# Patient Record
Sex: FEMALE | Race: OTHER | NOT HISPANIC OR LATINO
[De-identification: names, ages, dates, MRNs, and addresses within clinical notes are randomized per-mention and may not be internally consistent; named-entity substitution may affect disease eponyms.]

---

## 2017-03-13 ENCOUNTER — APPOINTMENT (OUTPATIENT)
Dept: CARDIOLOGY | Facility: CLINIC | Age: 67
End: 2017-03-13

## 2017-03-13 ENCOUNTER — NON-APPOINTMENT (OUTPATIENT)
Age: 67
End: 2017-03-13

## 2017-03-13 VITALS
HEIGHT: 65 IN | DIASTOLIC BLOOD PRESSURE: 79 MMHG | OXYGEN SATURATION: 98 % | SYSTOLIC BLOOD PRESSURE: 117 MMHG | HEART RATE: 68 BPM | BODY MASS INDEX: 26.33 KG/M2 | WEIGHT: 158 LBS

## 2017-03-21 ENCOUNTER — APPOINTMENT (OUTPATIENT)
Dept: CARDIOLOGY | Facility: CLINIC | Age: 67
End: 2017-03-21

## 2017-05-31 ENCOUNTER — RX RENEWAL (OUTPATIENT)
Age: 67
End: 2017-05-31

## 2017-09-18 ENCOUNTER — NON-APPOINTMENT (OUTPATIENT)
Age: 67
End: 2017-09-18

## 2017-09-18 ENCOUNTER — APPOINTMENT (OUTPATIENT)
Dept: CARDIOLOGY | Facility: CLINIC | Age: 67
End: 2017-09-18
Payer: MEDICARE

## 2017-09-18 VITALS — DIASTOLIC BLOOD PRESSURE: 63 MMHG | SYSTOLIC BLOOD PRESSURE: 97 MMHG

## 2017-09-18 VITALS
HEIGHT: 65 IN | SYSTOLIC BLOOD PRESSURE: 99 MMHG | DIASTOLIC BLOOD PRESSURE: 64 MMHG | HEART RATE: 58 BPM | BODY MASS INDEX: 25.66 KG/M2 | OXYGEN SATURATION: 99 % | WEIGHT: 154 LBS

## 2017-09-18 VITALS — DIASTOLIC BLOOD PRESSURE: 78 MMHG | SYSTOLIC BLOOD PRESSURE: 108 MMHG

## 2017-09-18 PROCEDURE — 99214 OFFICE O/P EST MOD 30 MIN: CPT

## 2017-09-18 PROCEDURE — 93000 ELECTROCARDIOGRAM COMPLETE: CPT

## 2018-02-12 ENCOUNTER — APPOINTMENT (OUTPATIENT)
Dept: CARDIOLOGY | Facility: CLINIC | Age: 68
End: 2018-02-12
Payer: MEDICARE

## 2018-02-12 ENCOUNTER — NON-APPOINTMENT (OUTPATIENT)
Age: 68
End: 2018-02-12

## 2018-02-12 VITALS
HEART RATE: 66 BPM | DIASTOLIC BLOOD PRESSURE: 78 MMHG | BODY MASS INDEX: 25.46 KG/M2 | SYSTOLIC BLOOD PRESSURE: 120 MMHG | OXYGEN SATURATION: 98 % | WEIGHT: 153 LBS

## 2018-02-12 PROCEDURE — 99214 OFFICE O/P EST MOD 30 MIN: CPT

## 2018-02-12 PROCEDURE — 93000 ELECTROCARDIOGRAM COMPLETE: CPT

## 2018-02-13 ENCOUNTER — OUTPATIENT (OUTPATIENT)
Dept: OUTPATIENT SERVICES | Facility: HOSPITAL | Age: 68
LOS: 1 days | End: 2018-02-13
Payer: MEDICARE

## 2018-02-13 ENCOUNTER — APPOINTMENT (OUTPATIENT)
Dept: RADIOLOGY | Facility: CLINIC | Age: 68
End: 2018-02-13
Payer: MEDICARE

## 2018-02-13 ENCOUNTER — APPOINTMENT (OUTPATIENT)
Dept: MAMMOGRAPHY | Facility: CLINIC | Age: 68
End: 2018-02-13
Payer: MEDICARE

## 2018-02-13 ENCOUNTER — APPOINTMENT (OUTPATIENT)
Dept: DERMATOLOGY | Facility: CLINIC | Age: 68
End: 2018-02-13
Payer: MEDICARE

## 2018-02-13 VITALS — HEIGHT: 65 IN | WEIGHT: 165 LBS | BODY MASS INDEX: 27.49 KG/M2

## 2018-02-13 DIAGNOSIS — Z00.8 ENCOUNTER FOR OTHER GENERAL EXAMINATION: ICD-10-CM

## 2018-02-13 PROCEDURE — 77080 DXA BONE DENSITY AXIAL: CPT | Mod: 26

## 2018-02-13 PROCEDURE — 77067 SCR MAMMO BI INCL CAD: CPT

## 2018-02-13 PROCEDURE — 77067 SCR MAMMO BI INCL CAD: CPT | Mod: 26

## 2018-02-13 PROCEDURE — 77080 DXA BONE DENSITY AXIAL: CPT

## 2018-02-13 PROCEDURE — 77063 BREAST TOMOSYNTHESIS BI: CPT | Mod: 26

## 2018-02-13 PROCEDURE — 99213 OFFICE O/P EST LOW 20 MIN: CPT

## 2018-02-13 PROCEDURE — 77063 BREAST TOMOSYNTHESIS BI: CPT

## 2018-08-31 ENCOUNTER — APPOINTMENT (OUTPATIENT)
Dept: CARDIOLOGY | Facility: CLINIC | Age: 68
End: 2018-08-31
Payer: MEDICARE

## 2018-08-31 ENCOUNTER — NON-APPOINTMENT (OUTPATIENT)
Age: 68
End: 2018-08-31

## 2018-08-31 VITALS
OXYGEN SATURATION: 99 % | DIASTOLIC BLOOD PRESSURE: 70 MMHG | HEART RATE: 68 BPM | RESPIRATION RATE: 16 BRPM | SYSTOLIC BLOOD PRESSURE: 120 MMHG | HEIGHT: 65 IN

## 2018-08-31 VITALS
BODY MASS INDEX: 25.66 KG/M2 | HEIGHT: 65 IN | DIASTOLIC BLOOD PRESSURE: 73 MMHG | OXYGEN SATURATION: 99 % | HEART RATE: 70 BPM | SYSTOLIC BLOOD PRESSURE: 116 MMHG | WEIGHT: 154 LBS

## 2018-08-31 DIAGNOSIS — Z82.49 FAMILY HISTORY OF ISCHEMIC HEART DISEASE AND OTHER DISEASES OF THE CIRCULATORY SYSTEM: ICD-10-CM

## 2018-08-31 DIAGNOSIS — Z83.3 FAMILY HISTORY OF DIABETES MELLITUS: ICD-10-CM

## 2018-08-31 PROCEDURE — 99204 OFFICE O/P NEW MOD 45 MIN: CPT

## 2018-08-31 PROCEDURE — 93000 ELECTROCARDIOGRAM COMPLETE: CPT

## 2018-09-05 ENCOUNTER — APPOINTMENT (OUTPATIENT)
Dept: CARDIOLOGY | Facility: CLINIC | Age: 68
End: 2018-09-05
Payer: MEDICARE

## 2018-09-05 PROCEDURE — 93224 XTRNL ECG REC UP TO 48 HRS: CPT

## 2018-09-06 ENCOUNTER — NON-APPOINTMENT (OUTPATIENT)
Age: 68
End: 2018-09-06

## 2018-09-10 ENCOUNTER — APPOINTMENT (OUTPATIENT)
Dept: CARDIOLOGY | Facility: CLINIC | Age: 68
End: 2018-09-10

## 2018-09-14 ENCOUNTER — APPOINTMENT (OUTPATIENT)
Dept: CARDIOLOGY | Facility: CLINIC | Age: 68
End: 2018-09-14
Payer: MEDICARE

## 2018-09-14 VITALS — OXYGEN SATURATION: 99 % | SYSTOLIC BLOOD PRESSURE: 105 MMHG | HEART RATE: 65 BPM | DIASTOLIC BLOOD PRESSURE: 69 MMHG

## 2018-09-14 VITALS — HEIGHT: 65 IN | BODY MASS INDEX: 25.83 KG/M2 | WEIGHT: 155 LBS

## 2018-09-14 PROCEDURE — 99214 OFFICE O/P EST MOD 30 MIN: CPT

## 2018-10-19 ENCOUNTER — APPOINTMENT (OUTPATIENT)
Dept: CARDIOLOGY | Facility: CLINIC | Age: 68
End: 2018-10-19
Payer: MEDICARE

## 2018-10-19 ENCOUNTER — NON-APPOINTMENT (OUTPATIENT)
Age: 68
End: 2018-10-19

## 2018-10-19 VITALS
DIASTOLIC BLOOD PRESSURE: 71 MMHG | WEIGHT: 157 LBS | HEART RATE: 67 BPM | SYSTOLIC BLOOD PRESSURE: 110 MMHG | OXYGEN SATURATION: 98 % | HEIGHT: 65 IN | BODY MASS INDEX: 26.16 KG/M2

## 2018-10-19 PROCEDURE — 99214 OFFICE O/P EST MOD 30 MIN: CPT | Mod: 25

## 2018-10-19 PROCEDURE — 93000 ELECTROCARDIOGRAM COMPLETE: CPT

## 2018-10-24 NOTE — ASU PATIENT PROFILE, ADULT - PSH
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

## 2018-10-25 NOTE — H&P ADULT - NSHPLABSRESULTS_GEN_ALL_CORE
EKG (10/19/18): SR at rate 66 bpm   Dobutamine Stress Echo (6/21/17): EF 55-60%negative for inducible ischemia

## 2018-10-25 NOTE — H&P ADULT - ASSESSMENT
This is a 68 y.o female with PMHx of HTN, HLD, CAD, s/p PCI to LAD (6/2005) who recently admitted to [facility] with c/o CP/SOB and discharged (negative stress/Echo/ trop) presented to cardiologist office with c/o 2 weeks of worsening IZQUIERDO associated with palpitation alleviated with rest. As pt continue to have symptoms and has h/o PCI, pt referred to cardiac cath with possible PCI.     # CAD  - plan for cardiac cath with possible PCI   - risks and benefits of procedure reviewed, all questions answered   - informed consent obtained, pt verbalized understanding.

## 2018-10-25 NOTE — H&P ADULT - NSHPREVIEWOFSYSTEMS_GEN_ALL_CORE
General: Pt denies recent weight loss/fever/chills    Neurological: denies numbness or  sensation loss    Cardiovascular: denies chest pain/palpitations/leg edema    Respiratory and Thorax: denies SOB/cough/wheezing    Gastrointestinal: denies abdominal pain/diarrhea/constipation/bloody stool    Genitourinary: denies urinary frequency/urgency/ dysuria    Musculoskeletal: denies joint pain or swelling, denies restricted motion    Hematologic: denies abnormal bleeding General: Pt denies recent weight loss/fever/chills    Neurological: denies numbness or  sensation loss    Cardiovascular: denies chest pain/leg edema (+) Palpitations    Respiratory and Thorax: denies cough/wheezing (+)IZQUIERDO    Gastrointestinal: denies abdominal pain/diarrhea/constipation/bloody stool    Genitourinary: denies urinary frequency/urgency/ dysuria    Musculoskeletal: denies joint pain or swelling, denies restricted motion    Hematologic: denies abnormal bleeding

## 2018-10-25 NOTE — H&P ADULT - FAMILY HISTORY
Mother  Still living? No  Family history of myocardial infarction, Age at diagnosis: Age Unknown  Family history of CABG, Age at diagnosis: Age Unknown     Sibling  Still living? Yes, Estimated age: Age Unknown  Family history of myocardial infarction, Age at diagnosis: Age Unknown

## 2018-10-25 NOTE — H&P ADULT - NSHPPHYSICALEXAM_GEN_ALL_CORE
Vital Signs : BP        HR       RR        BT             Constitutional: well developed, well nourished, no deformities and no acute distress    Neurological: Alert & Oriented x 3, LUIS, no focal deficits    HEENT: NC/AT, PERRLA, EOMI,  Neck supple.    Respiratory: CTA B/L, No wheezing/crackles/rhonchi    Cardiovascular: (+) S1 & S2, RRR, No m/r/g    Gastrointestinal: soft, NT, nondistended, (+) BS    Genitourinary: non distended bladder, voiding freely    Extremities: No pedal edema, No clubbing, No cyanosis    Skin:  normal skin color and pigmentation, no skin lesions Vital Signs : /60 HR 70 RR 16    General: Alert & Oriented x 3, LUIS, no focal deficits    HEENT: NC/AT, PERRLA, EOMI,  Neck supple.    Respiratory: CTA B/L, No wheezing/crackles/rhonchi    Cardiovascular: (+) S1 & S2, RRR, No m/r/g    Gastrointestinal: soft, NT, nondistended, (+) BS    Genitourinary: non distended bladder, voiding freely    Extremities: No pedal edema, No clubbing, No cyanosis    Skin:  normal skin color and pigmentation, no skin lesions

## 2018-10-25 NOTE — H&P ADULT - HISTORY OF PRESENT ILLNESS
This is a 68 y.o female with PMHx of HTN, HLD, CAD, s/p PCI to LAD (6/2005) who recently admitted to [facility] with c/o CP/SOB and discharged (negative stress/Echo/ trop) presented to cardiologist office with c/o 2 weeks of worsening IZQUIERDO associated with palpitation alleviated with rest. As pt continue to have symptoms and has h/o PCI, pt referred to cardiac cath with possible PCI.

## 2018-10-26 ENCOUNTER — OUTPATIENT (OUTPATIENT)
Dept: OUTPATIENT SERVICES | Facility: HOSPITAL | Age: 68
LOS: 1 days | Discharge: HOME | End: 2018-10-26
Payer: MEDICARE

## 2018-10-26 VITALS
HEART RATE: 73 BPM | SYSTOLIC BLOOD PRESSURE: 114 MMHG | RESPIRATION RATE: 16 BRPM | DIASTOLIC BLOOD PRESSURE: 65 MMHG | OXYGEN SATURATION: 100 %

## 2018-10-26 VITALS — HEART RATE: 68 BPM | HEIGHT: 65 IN | RESPIRATION RATE: 16 BRPM | WEIGHT: 156.97 LBS

## 2018-10-26 LAB
ANION GAP SERPL CALC-SCNC: 8 MMOL/L — SIGNIFICANT CHANGE UP (ref 5–17)
BUN SERPL-MCNC: 15 MG/DL — SIGNIFICANT CHANGE UP (ref 7–23)
CALCIUM SERPL-MCNC: 9.4 MG/DL — SIGNIFICANT CHANGE UP (ref 8.5–10.1)
CHLORIDE SERPL-SCNC: 106 MMOL/L — SIGNIFICANT CHANGE UP (ref 96–108)
CO2 SERPL-SCNC: 26 MMOL/L — SIGNIFICANT CHANGE UP (ref 22–31)
CREAT SERPL-MCNC: 0.65 MG/DL — SIGNIFICANT CHANGE UP (ref 0.5–1.3)
GLUCOSE SERPL-MCNC: 90 MG/DL — SIGNIFICANT CHANGE UP (ref 70–99)
HCT VFR BLD CALC: 38.4 % — SIGNIFICANT CHANGE UP (ref 34.5–45)
HGB BLD-MCNC: 12.8 G/DL — SIGNIFICANT CHANGE UP (ref 11.5–15.5)
MCHC RBC-ENTMCNC: 29.7 PG — SIGNIFICANT CHANGE UP (ref 27–34)
MCHC RBC-ENTMCNC: 33.3 GM/DL — SIGNIFICANT CHANGE UP (ref 32–36)
MCV RBC AUTO: 89.1 FL — SIGNIFICANT CHANGE UP (ref 80–100)
NRBC # BLD: 0 /100 WBCS — SIGNIFICANT CHANGE UP (ref 0–0)
PLATELET # BLD AUTO: 192 K/UL — SIGNIFICANT CHANGE UP (ref 150–400)
POTASSIUM SERPL-MCNC: 4.2 MMOL/L — SIGNIFICANT CHANGE UP (ref 3.5–5.3)
POTASSIUM SERPL-SCNC: 4.2 MMOL/L — SIGNIFICANT CHANGE UP (ref 3.5–5.3)
RBC # BLD: 4.31 M/UL — SIGNIFICANT CHANGE UP (ref 3.8–5.2)
RBC # FLD: 12.1 % — SIGNIFICANT CHANGE UP (ref 10.3–14.5)
SODIUM SERPL-SCNC: 140 MMOL/L — SIGNIFICANT CHANGE UP (ref 135–145)
WBC # BLD: 5.99 K/UL — SIGNIFICANT CHANGE UP (ref 3.8–10.5)
WBC # FLD AUTO: 5.99 K/UL — SIGNIFICANT CHANGE UP (ref 3.8–10.5)

## 2018-10-26 PROCEDURE — 93458 L HRT ARTERY/VENTRICLE ANGIO: CPT | Mod: 26

## 2018-10-26 PROCEDURE — 93571 IV DOP VEL&/PRESS C FLO 1ST: CPT | Mod: 26,RC

## 2018-10-26 RX ORDER — RANOLAZINE 500 MG/1
1 TABLET, FILM COATED, EXTENDED RELEASE ORAL
Qty: 60 | Refills: 0 | OUTPATIENT
Start: 2018-10-26 | End: 2018-11-24

## 2018-10-26 RX ORDER — CLOPIDOGREL BISULFATE 75 MG/1
1 TABLET, FILM COATED ORAL
Qty: 0 | Refills: 0 | COMMUNITY

## 2018-10-26 RX ORDER — BISOPROLOL FUMARATE 10 MG/1
0.25 TABLET, FILM COATED ORAL
Qty: 0 | Refills: 0 | COMMUNITY

## 2018-10-26 RX ORDER — BISOPROLOL FUMARATE 10 MG/1
0.5 TABLET, FILM COATED ORAL
Qty: 0 | Refills: 0 | COMMUNITY

## 2018-10-26 RX ORDER — ATORVASTATIN CALCIUM 80 MG/1
1 TABLET, FILM COATED ORAL
Qty: 0 | Refills: 0 | COMMUNITY

## 2018-10-26 RX ORDER — ALLOPURINOL 300 MG
1 TABLET ORAL
Qty: 0 | Refills: 0 | COMMUNITY

## 2018-10-26 NOTE — PACU DISCHARGE NOTE - COMMENTS
Patient educated on discharge instructions including medication regime, activity level, follow up appointments, and the signs and symptoms requiring the notification of their provider.  Verbalized understanding utilizing the teach back method.  Written instructions provided as well. IV removed, site unremarkable. Right wrist dressing clean, dry & intact.

## 2018-10-26 NOTE — PROGRESS NOTE ADULT - SUBJECTIVE AND OBJECTIVE BOX
HPI:  This is a 68 y.o female with PMHx of HTN, HLD, CAD, s/p PCI to LAD (6/2005) who recently admitted to [facility] with c/o CP/SOB and discharged (negative stress/Echo/ trop) presented to cardiologist office with c/o 2 weeks of worsening IZQUIERDO associated with palpitation alleviated with rest. As pt continue to have symptoms and has h/o PCI, pt referred to cardiac cath with possible PCI.     s/p LHC :  RCA 60% stenosis, s/p FFR: hemodynamically stable CAD  Pt denies chest pain/SOB/palpitations post cath.    Physical exam:  Vital Signs : /60 HR 67  Cardiac : (+)S1S2, RRR  Lungs: CTA B/L  Abd: soft, NT, (+)BS  Ext: Rt radial (+) hemoband , 2+ Rt radial pulses    Labs:                        12.8   5.99  )-----------( 192      ( 26 Oct 2018 12:18 )             38.4     10-26    140  |  106  |  15  ----------------------------<  90  4.2   |  26  |  0.65    Ca    9.4      26 Oct 2018 12:18    A/P : Non obstructive CAD  -Rt hemoband to be removed @17:00  -encourage po hydration  -medical Mx for CAD, start ranexa 500mg po BID  -d/c home today  -f/u with Dr. Palla in 1-2 weeks  -Plan discussed with pt/Dr. Gutiérrez.

## 2018-10-28 ENCOUNTER — INPATIENT (INPATIENT)
Facility: HOSPITAL | Age: 68
LOS: 1 days | Discharge: HOME | End: 2018-10-30
Attending: INTERNAL MEDICINE | Admitting: INTERNAL MEDICINE
Payer: MEDICARE

## 2018-10-28 VITALS
OXYGEN SATURATION: 100 % | HEIGHT: 62 IN | SYSTOLIC BLOOD PRESSURE: 154 MMHG | TEMPERATURE: 98 F | RESPIRATION RATE: 18 BRPM | WEIGHT: 160.06 LBS | HEART RATE: 102 BPM | DIASTOLIC BLOOD PRESSURE: 85 MMHG

## 2018-10-28 DIAGNOSIS — I24.9 ACUTE ISCHEMIC HEART DISEASE, UNSPECIFIED: ICD-10-CM

## 2018-10-28 DIAGNOSIS — I21.4 NON-ST ELEVATION (NSTEMI) MYOCARDIAL INFARCTION: ICD-10-CM

## 2018-10-28 LAB
ALBUMIN SERPL ELPH-MCNC: 3.7 G/DL — SIGNIFICANT CHANGE UP (ref 3.3–5)
ALP SERPL-CCNC: 80 U/L — SIGNIFICANT CHANGE UP (ref 40–120)
ALT FLD-CCNC: 22 U/L — SIGNIFICANT CHANGE UP (ref 12–78)
ANION GAP SERPL CALC-SCNC: 6 MMOL/L — SIGNIFICANT CHANGE UP (ref 5–17)
APPEARANCE UR: CLEAR — SIGNIFICANT CHANGE UP
AST SERPL-CCNC: 22 U/L — SIGNIFICANT CHANGE UP (ref 15–37)
BILIRUB SERPL-MCNC: 0.3 MG/DL — SIGNIFICANT CHANGE UP (ref 0.2–1.2)
BILIRUB UR-MCNC: NEGATIVE — SIGNIFICANT CHANGE UP
BUN SERPL-MCNC: 19 MG/DL — SIGNIFICANT CHANGE UP (ref 7–23)
CALCIUM SERPL-MCNC: 9.2 MG/DL — SIGNIFICANT CHANGE UP (ref 8.5–10.1)
CHLORIDE SERPL-SCNC: 108 MMOL/L — SIGNIFICANT CHANGE UP (ref 96–108)
CO2 SERPL-SCNC: 26 MMOL/L — SIGNIFICANT CHANGE UP (ref 22–31)
COLOR SPEC: YELLOW — SIGNIFICANT CHANGE UP
CREAT SERPL-MCNC: 0.74 MG/DL — SIGNIFICANT CHANGE UP (ref 0.5–1.3)
DIFF PNL FLD: NEGATIVE — SIGNIFICANT CHANGE UP
GLUCOSE SERPL-MCNC: 105 MG/DL — HIGH (ref 70–99)
GLUCOSE UR QL: NEGATIVE MG/DL — SIGNIFICANT CHANGE UP
HCT VFR BLD CALC: 36 % — SIGNIFICANT CHANGE UP (ref 34.5–45)
HGB BLD-MCNC: 11.9 G/DL — SIGNIFICANT CHANGE UP (ref 11.5–15.5)
KETONES UR-MCNC: NEGATIVE — SIGNIFICANT CHANGE UP
LEUKOCYTE ESTERASE UR-ACNC: ABNORMAL
MCHC RBC-ENTMCNC: 29.8 PG — SIGNIFICANT CHANGE UP (ref 27–34)
MCHC RBC-ENTMCNC: 33.1 GM/DL — SIGNIFICANT CHANGE UP (ref 32–36)
MCV RBC AUTO: 90 FL — SIGNIFICANT CHANGE UP (ref 80–100)
NITRITE UR-MCNC: NEGATIVE — SIGNIFICANT CHANGE UP
NRBC # BLD: 0 /100 WBCS — SIGNIFICANT CHANGE UP (ref 0–0)
PH UR: 6.5 — SIGNIFICANT CHANGE UP (ref 5–8)
PLATELET # BLD AUTO: 174 K/UL — SIGNIFICANT CHANGE UP (ref 150–400)
POTASSIUM SERPL-MCNC: 5.2 MMOL/L — SIGNIFICANT CHANGE UP (ref 3.5–5.3)
POTASSIUM SERPL-SCNC: 5.2 MMOL/L — SIGNIFICANT CHANGE UP (ref 3.5–5.3)
PROT SERPL-MCNC: 7.7 GM/DL — SIGNIFICANT CHANGE UP (ref 6–8.3)
PROT UR-MCNC: NEGATIVE MG/DL — SIGNIFICANT CHANGE UP
RBC # BLD: 4 M/UL — SIGNIFICANT CHANGE UP (ref 3.8–5.2)
RBC # FLD: 12.1 % — SIGNIFICANT CHANGE UP (ref 10.3–14.5)
SODIUM SERPL-SCNC: 140 MMOL/L — SIGNIFICANT CHANGE UP (ref 135–145)
SP GR SPEC: 1 — LOW (ref 1.01–1.02)
TROPONIN I SERPL-MCNC: 0.18 NG/ML — HIGH (ref 0.01–0.04)
TROPONIN I SERPL-MCNC: 0.22 NG/ML — HIGH (ref 0.01–0.04)
UROBILINOGEN FLD QL: NEGATIVE MG/DL — SIGNIFICANT CHANGE UP
WBC # BLD: 7.35 K/UL — SIGNIFICANT CHANGE UP (ref 3.8–10.5)
WBC # FLD AUTO: 7.35 K/UL — SIGNIFICANT CHANGE UP (ref 3.8–10.5)

## 2018-10-28 PROCEDURE — 93010 ELECTROCARDIOGRAM REPORT: CPT | Mod: 76

## 2018-10-28 PROCEDURE — 99223 1ST HOSP IP/OBS HIGH 75: CPT

## 2018-10-28 PROCEDURE — 71046 X-RAY EXAM CHEST 2 VIEWS: CPT | Mod: 26

## 2018-10-28 PROCEDURE — 93970 EXTREMITY STUDY: CPT | Mod: 26

## 2018-10-28 PROCEDURE — 99285 EMERGENCY DEPT VISIT HI MDM: CPT | Mod: 25

## 2018-10-28 PROCEDURE — 93010 ELECTROCARDIOGRAM REPORT: CPT

## 2018-10-28 PROCEDURE — 78582 LUNG VENTILAT&PERFUS IMAGING: CPT | Mod: 26

## 2018-10-28 RX ORDER — CLOPIDOGREL BISULFATE 75 MG/1
75 TABLET, FILM COATED ORAL DAILY
Qty: 0 | Refills: 0 | Status: DISCONTINUED | OUTPATIENT
Start: 2018-10-28 | End: 2018-10-30

## 2018-10-28 RX ORDER — BISOPROLOL FUMARATE 10 MG/1
2.5 TABLET, FILM COATED ORAL DAILY
Qty: 0 | Refills: 0 | Status: DISCONTINUED | OUTPATIENT
Start: 2018-10-28 | End: 2018-10-30

## 2018-10-28 RX ORDER — ATORVASTATIN CALCIUM 80 MG/1
20 TABLET, FILM COATED ORAL AT BEDTIME
Qty: 0 | Refills: 0 | Status: DISCONTINUED | OUTPATIENT
Start: 2018-10-28 | End: 2018-10-30

## 2018-10-28 RX ORDER — INFLUENZA VIRUS VACCINE 15; 15; 15; 15 UG/.5ML; UG/.5ML; UG/.5ML; UG/.5ML
0.5 SUSPENSION INTRAMUSCULAR ONCE
Qty: 0 | Refills: 0 | Status: COMPLETED | OUTPATIENT
Start: 2018-10-28 | End: 2018-10-28

## 2018-10-28 RX ORDER — CLOPIDOGREL BISULFATE 75 MG/1
225 TABLET, FILM COATED ORAL ONCE
Qty: 0 | Refills: 0 | Status: COMPLETED | OUTPATIENT
Start: 2018-10-28 | End: 2018-10-29

## 2018-10-28 RX ORDER — SODIUM CHLORIDE 9 MG/ML
3 INJECTION INTRAMUSCULAR; INTRAVENOUS; SUBCUTANEOUS ONCE
Qty: 0 | Refills: 0 | Status: COMPLETED | OUTPATIENT
Start: 2018-10-28 | End: 2018-10-28

## 2018-10-28 RX ORDER — HEPARIN SODIUM 5000 [USP'U]/ML
5000 INJECTION INTRAVENOUS; SUBCUTANEOUS EVERY 8 HOURS
Qty: 0 | Refills: 0 | Status: DISCONTINUED | OUTPATIENT
Start: 2018-10-28 | End: 2018-10-30

## 2018-10-28 RX ORDER — ALLOPURINOL 300 MG
100 TABLET ORAL DAILY
Qty: 0 | Refills: 0 | Status: DISCONTINUED | OUTPATIENT
Start: 2018-10-28 | End: 2018-10-30

## 2018-10-28 RX ADMIN — ATORVASTATIN CALCIUM 20 MILLIGRAM(S): 80 TABLET, FILM COATED ORAL at 21:02

## 2018-10-28 RX ADMIN — SODIUM CHLORIDE 3 MILLILITER(S): 9 INJECTION INTRAMUSCULAR; INTRAVENOUS; SUBCUTANEOUS at 04:09

## 2018-10-28 RX ADMIN — CLOPIDOGREL BISULFATE 75 MILLIGRAM(S): 75 TABLET, FILM COATED ORAL at 10:58

## 2018-10-28 RX ADMIN — Medication 100 MILLIGRAM(S): at 10:57

## 2018-10-28 RX ADMIN — BISOPROLOL FUMARATE 2.5 MILLIGRAM(S): 10 TABLET, FILM COATED ORAL at 10:58

## 2018-10-28 NOTE — H&P ADULT - HISTORY OF PRESENT ILLNESS
68F.  admitted 10/28/18.  presented to ED c/o a cold sensation to the neck and jaw.  onset 30 minutes prior to taking new medication.  daughter gave sl NTG.  in ED, CE ordered.  TnI elevated.    recently admitted and discharged 10/25-26/18 from .  patient presented at that time with SOB and palpitations for 2 weeks.  LHC RCA 60% stenosis, non obstructive CAD.  medical management and lifestyle modifications were advised.  Ranexa 500mggpo bid was started.  patient was to follow up with Dr. Palla in 1-2 weeks.    ROS:  + chills.    PMHx  CAD-PCI to LAD (2005);  HTN;  HLD;  OP;  gout.    PSHx:  OS cataract extraction;  tooth disease-s/p upper and lower implants.    ALL:  aspirin (Other (Moderate); Swelling).    Rx:  Home Medications:  allopurinol 100 mg oral tablet: 1 tab(s) orally once a day (26 Oct 2018 11:15)  atorvastatin 20 mg oral tablet: 1 tab(s) orally once a day (26 Oct 2018 11:15)  bisoprolol: 2.5 milligram(s) orally once a day (26 Oct 2018 11:15)  Plavix 75 mg oral tablet: 1 tab(s) orally once a day (26 Oct 2018 11:15)    SocHx:  no tobacco.    FHx:  mother + AMI;  sibling + AMI. 68F.  admitted 10/28/18.  family at bedside.  presented to ED c/o a cold sensation to the neck and jaw.  onset 30 minutes prior to taking new medication.  daughter gave sl NTG.  in ED, CE ordered.  TnI elevated.    recently admitted and discharged 10/25-26/18 from .  patient presented at that time with SOB and palpitations for 2 weeks.  LHC RCA 60% stenosis, non obstructive CAD.  medical management and lifestyle modifications were advised.  Ranexa 500mggpo bid was started.  patient was to follow up with Dr. Palla in 1-2 weeks.    ROS:  + chills.    PMHx  CAD-PCI to LAD (2005);  HTN;  HLD;  OP;  gout.    PSHx:  OS cataract extraction;  tooth disease-s/p upper and lower implants.    ALL:  aspirin (Other (Moderate); Swelling).    Rx:  Home Medications:  allopurinol 100 mg oral tablet: 1 tab(s) orally once a day (26 Oct 2018 11:15)  atorvastatin 20 mg oral tablet: 1 tab(s) orally once a day (26 Oct 2018 11:15)  bisoprolol: 2.5 milligram(s) orally once a day (26 Oct 2018 11:15)  Plavix 75 mg oral tablet: 1 tab(s) orally once a day (26 Oct 2018 11:15)    SocHx:  no tobacco.  no EtOH.  lives w/ family.  .  2 children.  country of origin, [place].     FHx:  mother + AMI;  sibling + AMI.

## 2018-10-28 NOTE — ED PROVIDER NOTE - OBJECTIVE STATEMENT
69 yo female hx of CAD s/p stent in 2005 and recent cath 2 days ago presents s/p taking medication and had a feeling of cold in neck and jaw. Patients denies any chest pain or fever.  Symptoms started about 30 minutes after taking new medication.  Daughter gave SL NTG at home

## 2018-10-28 NOTE — ED ADULT NURSE NOTE - OBJECTIVE STATEMENT
Patient presenst to ED complaining of shakiness. Patient states she had angiogram done at  Friday. Patient was prescribed new medication Ranexa 500 mg. Patient states today was first day on medication, 10 minutes after taking night dose patient began to feel "shakiness". Patient feels " body trembling" from jaw to chest and down extremities. Patient denies CP. Patient complaining of difficulty speaking. Patient denies CP, SOB, dizziness, fevers, NVD. Patient denies vision changes, itchiness, rash. As per daughter Nitro sublingual taken prior to arrival. A&0x3, family at bedside

## 2018-10-28 NOTE — ED ADULT NURSE REASSESSMENT NOTE - NS ED NURSE REASSESS COMMENT FT1
Order obtained from Dr. D'Amico to repeat troponin, order placed. Daughter at bedside requesting phlebotomy to draw blood, phlebotomist paged, pending blood drawn. Will continue monitoring patient.

## 2018-10-28 NOTE — ED ADULT NURSE NOTE - NSIMPLEMENTINTERV_GEN_ALL_ED
Implemented All Universal Safety Interventions:  [place] to call system. Call bell, personal items and telephone within reach. Instruct patient to call for assistance. Room bathroom lighting operational. Non-slip footwear when patient is off stretcher. Physically safe environment: no spills, clutter or unnecessary equipment. Stretcher in lowest position, wheels locked, appropriate side rails in place.

## 2018-10-28 NOTE — ED ADULT TRIAGE NOTE - CHIEF COMPLAINT QUOTE
as per daughter pt had angiogram done yesterday.  pt has been unable to sleeping this evening and prior to arrival development jaw pain and shakiness.

## 2018-10-28 NOTE — H&P ADULT - ASSESSMENT
68F.  admitted 10/28/18.  presented to ED c/o a cold sensation to the neck and jaw.  onset 30 minutes prior to taking new medication.  daughter gave sl NTG.  in ED, CE ordered.  TnI elevated.    recently admitted and discharged 10/25-26/18 from .  patient presented at that time with SOB and palpitations for 2 weeks.  LHC RCA 60% stenosis, non obstructive CAD.  medical management and lifestyle modifications were advised.  Ranexa 500mggpo bid was started.  patient was to follow up with Dr. Palla in 1-2 weeks.    PMHx  CAD-PCI to LAD (2005);  HTN;  HLD;  OP;  gout. 68F.  admitted 10/28/18.  presented to ED c/o a cold sensation to the neck and jaw.  onset 30 minutes prior to taking new medication.  daughter gave sl NTG.  in ED, CE ordered.  TnI elevated.    recently admitted and discharged 10/25-26/18 from .  patient presented at that time with SOB and palpitations for 2 weeks.  C RCA 60% stenosis (FFR 17%), non obstructive CAD.  medical management and lifestyle modifications were advised.  Ranexa 500mggpo bid was started.  patient was to follow up with Dr. Palla in 1-2 weeks.    PMHx  CAD-PCI to LAD (2005);  HTN;  HLD;  OP;  gout.    equivocal TnI.  -no chest pain or acute symptoms at current time.  -EKG no acute findings.  -reports recent travel hx outside [facility] in setting of equivocal TnI.  -b/l LE venous dopplers, r/o DVT.  -VQ scan, r/o PE (VQ scan ordered in lieu of CTA.  anticipate IVC for [facility] tomorrow).  -coronary angiogram + PCI scheduled tomorrow once PE ruled out.  -Plavix.  -BB.  -statin.  -Cardiology.  -Pulmonology.    DVT prophylaxis.  -UFH sq.    disposition.  -telemetry.    communication.  -RN.  -Cardiology.  -Pulmonology.

## 2018-10-28 NOTE — PROVIDER CONTACT NOTE (OTHER) - SITUATION
Dr Galindo is on unit. aware of consult
Dr Palla is on unit and is aware of consult
spoke to answering service. they are aware of consult

## 2018-10-28 NOTE — CONSULT NOTE ADULT - PROBLEM SELECTOR RECOMMENDATION 9
While FFR was negative on friday the patients recurrent symptoms and elevated tropinins point to the RCA stenosis being falsely negative by FFR. As well as, there is a subset of patients that rarely still cross over to needing PCI despite a negative FFR.  V/Q scan negative as well. Plan for PCI jennifer.  Is aspirin allergic so will sensitize jennifer.

## 2018-10-28 NOTE — H&P ADULT - NSHPPHYSICALEXAM_GEN_ALL_CORE
Vital Signs Last 24 Hrs  T(C): 36.8 (28 Oct 2018 07:27), Max: 36.8 (28 Oct 2018 07:27)  T(F): 98.3 (28 Oct 2018 07:27), Max: 98.3 (28 Oct 2018 07:27)  HR: 72 (28 Oct 2018 07:27) (72 - 102)  BP: 123/68 (28 Oct 2018 07:27) (120/76 - 154/85)  BP(mean): --  RR: 19 (28 Oct 2018 07:27) (18 - 19)  SpO2: 100% (28 Oct 2018 07:27) (100% - 100%)    Constitutional: NAD, awake and alert, well-developed with good responses to questions, mentally intact.   HEENT: PERRL, EOMI, MMM.  Neck: Soft and supple, No carotid bruit, No JVD  Respiratory: Breath sounds are clear bilaterally, No wheezing, rales or rhonchi  Cardiovascular: S1 and S2, regular rate and rhythm, no murmur, rub or gallop.  Gastrointestinal: Bowel Sounds present, soft, nontender, nondistended, no guarding, no rebound, no mass.  Extremities: No peripheral edema  Vascular: 2+ peripheral pulses  Neurological: A/O x 3, no focal deficits  Musculoskeletal: 5/5 strength b/l upper and lower extremities  Skin:  no visible rashes.

## 2018-10-28 NOTE — CONSULT NOTE ADULT - SUBJECTIVE AND OBJECTIVE BOX
HPI:    68 y.o.f  admitted with c/o a cold sensation to the neck and jaw.  onset 30 minutes prior to taking new medication ranaxa, elevated troponins. pat s/p angio 10/18 electively with RCA 66% blockage, LHC RCA 60% stenosis, non obstructive CAD.  medical management and lifestyle modifications were advised.  Ranexa 500mggpo bid was started.  patient just came back from [place] on 10/16. No calf pain.    ROS:  + chills.    PMHx  CAD-PCI to LAD ();  HTN;  HLD;  OP;  gout.    PSHx:  OS cataract extraction;  tooth disease-s/p upper and lower implants.    ALL:  aspirin (Other (Moderate); Swelling).    Rx:  Home Medications:  allopurinol 100 mg oral tablet: 1 tab(s) orally once a day (26 Oct 2018 11:15)  atorvastatin 20 mg oral tablet: 1 tab(s) orally once a day (26 Oct 2018 11:15)  bisoprolol: 2.5 milligram(s) orally once a day (26 Oct 2018 11:15)  Plavix 75 mg oral tablet: 1 tab(s) orally once a day (26 Oct 2018 11:15)    SocHx:  no tobacco.    FHx:  mother + AMI;  sibling + AMI. (28 Oct 2018 08:20)      PAST MEDICAL & SURGICAL HISTORY:  Osteoporosis  Gout  Hypertension  Hyperlipidemia  Coronary artery disease  Tooth disease: s/p upper and lower implants  S/P cataract extraction: left  S/P angioplasty with stent: x 2005      Home Medications:  allopurinol 100 mg oral tablet: 1 tab(s) orally once a day (26 Oct 2018 11:15)  atorvastatin 20 mg oral tablet: 1 tab(s) orally once a day (26 Oct 2018 11:15)  bisoprolol: 2.5 milligram(s) orally once a day (26 Oct 2018 11:15)  Plavix 75 mg oral tablet: 1 tab(s) orally once a day (26 Oct 2018 11:15)      MEDICATIONS  (STANDING):  allopurinol 100 milliGRAM(s) Oral daily  atorvastatin 20 milliGRAM(s) Oral at bedtime  bisoprolol   Tablet 2.5 milliGRAM(s) Oral daily  clopidogrel Tablet 75 milliGRAM(s) Oral daily    MEDICATIONS  (PRN):      Allergies    aspirin (Other (Moderate); Swelling)    Intolerances        SOCIAL HISTORY: Denies tobacco, etoh abuse or illicit drug use    FAMILY HISTORY:  Family history of CABG (Mother)  Family history of myocardial infarction (Mother, Sibling)      Vital Signs Last 24 Hrs  T(C): 36.7 (28 Oct 2018 09:30), Max: 36.8 (28 Oct 2018 07:27)  T(F): 98.1 (28 Oct 2018 09:30), Max: 98.3 (28 Oct 2018 07:27)  HR: 75 (28 Oct 2018 09:30) (72 - 102)  BP: 128/61 (28 Oct 2018 09:30) (120/76 - 154/85)  BP(mean): --  RR: 18 (28 Oct 2018 09:30) (18 - 19)  SpO2: 97% (28 Oct 2018 09:30) (97% - 100%)        REVIEW OF SYSTEMS:    CONSTITUTIONAL:  As per HPI.  HEENT:  Eyes:  No diplopia or blurred vision. ENT:  No earache, sore throat or runny nose.  CARDIOVASCULAR:  No pressure, squeezing, tightness, heaviness or aching about the chest, neck, axilla or epigastrium.  RESPIRATORY:  No cough, shortness of breath, PND or orthopnea.  GASTROINTESTINAL:  No nausea, vomiting or diarrhea.  GENITOURINARY:  No dysuria, frequency or urgency.  MUSCULOSKELETAL:  As per HPI.  SKIN:  No change in skin, hair or nails.  NEUROLOGIC:  No paresthesias, fasciculations, seizures or weakness.  PSYCHIATRIC:  No disorder of thought or mood.  ENDOCRINE:  No heat or cold intolerance, polyuria or polydipsia.  HEMATOLOGICAL:  No easy bruising or bleedings:  .     PHYSICAL EXAMINATION:    GENERAL APPEARANCE:  Pt. is not currently dyspneic, in no distress. Pt. is alert, oriented, and pleasant.  HEENT:  Pupils are normal and react normally. No icterus. Mucous membranes well colored.  NECK:  Supple. No lymphadenopathy. Jugular venous pressure not elevated. Carotids equal.   HEART:   The cardiac impulse has a normal quality. Regular. Normal S1 and S2. There are no murmurs, rubs or gallops noted  CHEST:  Chest is clear to auscultation. Normal respiratory effort.  ABDOMEN:  Soft and nontender.   EXTREMITIES:  There is no cyanosis, clubbing or edema.   SKIN:  No rash or significant lesions are noted.    LABS:                        11.9   7.35  )-----------( 174      ( 28 Oct 2018 03:57 )             36.0     10-28    140  |  108  |  19  ----------------------------<  105<H>  5.2   |  26  |  0.74    Ca    9.2      28 Oct 2018 03:57    TPro  7.7  /  Alb  3.7  /  TBili  0.3  /  DBili  x   /  AST  22  /  ALT  22  /  AlkPhos  80  10-28    LIVER FUNCTIONS - ( 28 Oct 2018 03:57 )  Alb: 3.7 g/dL / Pro: 7.7 gm/dL / ALK PHOS: 80 U/L / ALT: 22 U/L / AST: 22 U/L / GGT: x             CARDIAC MARKERS ( 28 Oct 2018 08:53 )  0.184 ng/mL / x     / x     / x     / x      CARDIAC MARKERS ( 28 Oct 2018 03:57 )  0.221 ng/mL / x     / x     / x     / x          Urinalysis Basic - ( 28 Oct 2018 04:19 )    Color: Yellow / Appearance: Clear / S.005 / pH: x  Gluc: x / Ketone: Negative  / Bili: Negative / Urobili: Negative mg/dL   Blood: x / Protein: Negative mg/dL / Nitrite: Negative   Leuk Esterase: Moderate / RBC: x / WBC x   Sq Epi: x / Non Sq Epi: x / Bacteria: x    RADIOLOGY & ADDITIONAL STUDIES:     Chest 2 Views PA/Lat (10.28.18 @ 03:52) >  Impression:    No acute disease    No significant interval changeas compared to  2013

## 2018-10-28 NOTE — ED PROVIDER NOTE - MEDICAL DECISION MAKING DETAILS
Patient with extensive cardiac hx and new medication.  Most likely a side effect of new medication but with hx ACS is also a possibility.

## 2018-10-28 NOTE — CONSULT NOTE ADULT - ASSESSMENT
PROBLEMS:    Cold sensation to the neck and jaw with SOB and palpitations for 2 weeks- [facility] RCA 60% stenosis, non obstructive CAD, positive troponins with recent h/o travel will do duplex of lower extermities & VQ scan to r/o thrombo-embolism d/w family & dr d,amico.

## 2018-10-28 NOTE — H&P ADULT - NSHPLABSRESULTS_GEN_ALL_CORE
12/30/2015 TTE, < from: TTE Echo Complete w/Doppler (12.30.15 @ 10:52) >     IMPRESSION:  Summary:   1. Normal global left ventricular systolic function.   2. Left ventricular ejection fraction, by visual estimation, is 65 to   70%.   3. Mild mitral valve regurgitation.   4. There is no evidenceof pericardial effusion.    < end of copied text >    06/21/17 dobutamine stress echo, EF 55-60%negative for inducible ischemia.

## 2018-10-28 NOTE — ED ADULT NURSE REASSESSMENT NOTE - NS ED NURSE REASSESS COMMENT FT1
Received patient in bed, with daughter at bedside. VSS. Cardiac and O2 monitoring in progress. Pending admission orders, no resp or complaints at this time. Will continue monitoring patient.

## 2018-10-28 NOTE — CONSULT NOTE ADULT - SUBJECTIVE AND OBJECTIVE BOX
HPI:  68F.  admitted 10/28/18.  presented to ED c/o a cold sensation to the neck and jaw.  onset 30 minutes prior to taking new medication.  daughter gave sl NTG.  in ED, CE ordered.  TnI elevated. recently admitted and discharged 10/26/18 from .  patient presented at that time with SOB and palpitations for 2 weeks.  LHC RCA 60% stenosis, non obstructive CAD.  medical management and lifestyle modifications were advised.  Ranexa 500mggpo bid was started.  patient was to follow up with Dr. Palla in 1-2 weeks.    ROS:  + chills.    PMHx  CAD-PCI to LAD (2005);  HTN;  HLD;  OP;  gout.    PSHx:  OS cataract extraction;  tooth disease-s/p upper and lower implants.    ALL:  aspirin (Other (Moderate); Swelling).    Rx:  Home Medications:  allopurinol 100 mg oral tablet: 1 tab(s) orally once a day (26 Oct 2018 11:15)  atorvastatin 20 mg oral tablet: 1 tab(s) orally once a day (26 Oct 2018 11:15)  bisoprolol: 2.5 milligram(s) orally once a day (26 Oct 2018 11:15)  Plavix 75 mg oral tablet: 1 tab(s) orally once a day (26 Oct 2018 11:15)    SocHx:  no tobacco.    FHx:  mother + AMI;  sibling + AMI. 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    FAMILY HISTORY:  Family history of CABG (Mother)  Family history of myocardial infarction (Mother, Sibling)      Allergies    aspirin (Other (Moderate); Swelling)    Intolerances        Home Medications:  allopurinol 100 mg oral tablet: 1 tab(s) orally once a day (26 Oct 2018 11:15)  atorvastatin 20 mg oral tablet: 1 tab(s) orally once a day (26 Oct 2018 11:15)  bisoprolol: 2.5 milligram(s) orally once a day (26 Oct 2018 11:15)  Plavix 75 mg oral tablet: 1 tab(s) orally once a day (26 Oct 2018 11:15)      HOSPITAL MEDICATIONS:   MEDICATIONS  (STANDING):  allopurinol 100 milliGRAM(s) Oral daily  atorvastatin 20 milliGRAM(s) Oral at bedtime  bisoprolol   Tablet 2.5 milliGRAM(s) Oral daily  clopidogrel Tablet 75 milliGRAM(s) Oral daily    MEDICATIONS  (PRN):      REVIEW OF SYSTEMS: 13 systems were reviewed and all negative except for comments above.    Vital Signs Last 24 Hrs  T(C): 36.7 (28 Oct 2018 09:30), Max: 36.8 (28 Oct 2018 07:27)  T(F): 98.1 (28 Oct 2018 09:30), Max: 98.3 (28 Oct 2018 07:27)  HR: 75 (28 Oct 2018 09:30) (72 - 102)  BP: 128/61 (28 Oct 2018 09:30) (120/76 - 154/85)  BP(mean): --  RR: 18 (28 Oct 2018 09:30) (18 - 19)  SpO2: 97% (28 Oct 2018 09:30) (97% - 100%)Daily Height in cm: 157 (28 Oct 2018 09:30)    Daily I&O's Summary      PHYSICAL EXAM:    Constitutional: NAD, awake and alert, well-developed  HEENT: PERRLA, EOMI,  No oral cyanosis. Oropharynx Clean and Dry.  Neck:  supple,  No JVD, No Thyroid enlargement. No Carotid Bruits bilaterally.  Respiratory: Breath sounds are clear bilaterally, No wheezing, rales or rhonchi  Cardiovascular: NL S1 and S2, RRR, no Murmurs, gallops or rubs  Gastrointestinal: Bowel Sounds present, soft, NT, ND, No HSM.   Extremities: No peripheral edema. No clubbing or cyanosis.  Barbeau Test performed in R+L UE and Negative.  Vascular: 2+ peripheral pulses in LE   Neurological: A/O x 3, no focal motor or sensory deficits  Musculoskeletal: no calf tenderness or joint swelling.  Skin: No rashes or lessions.      LABS: All Labs Reviewed:                        11.9   7.35  )-----------( 174      ( 28 Oct 2018 03:57 )             36.0                         12.8   5.99  )-----------( 192      ( 26 Oct 2018 12:18 )             38.4     28 Oct 2018 03:57    140    |  108    |  19     ----------------------------<  105    5.2     |  26     |  0.74   26 Oct 2018 12:18    140    |  106    |  15     ----------------------------<  90     4.2     |  26     |  0.65     Ca    9.2        28 Oct 2018 03:57  Ca    9.4        26 Oct 2018 12:18    TPro  7.7    /  Alb  3.7    /  TBili  0.3    /  DBili  x      /  AST  22     /  ALT  22     /  AlkPhos  80     28 Oct 2018 03:57      CARDIAC MARKERS ( 28 Oct 2018 08:53 )  0.184 ng/mL / x     / x     / x     / x      CARDIAC MARKERS ( 28 Oct 2018 03:57 )  0.221 ng/mL / x     / x     / x     / x                RADIOLOGY:    EKG:    ECHO:    CARDIAC CATHTERIZATION/STRESS TEST: HPI:  68F.  admitted 10/28/18.  presented to ED c/o a cold sensation to the neck and jaw with some diaphoresis.  Onset 30 minutes prior to taking new medication.  daughter gave sl NTG. TnI elevated. Had CC on 10/25/18 for atypical chest pain , SOB and palpitations for 2 weeks.  Cath showed patent LAD stent and 60% stenosis in RCA with FFR 0.83, Medical management with the addition of Ranexa 500mg po bid was started.  Patient started medication sat. am and symptoms came about sat. night. Had recent travel to [facility] weeks earlier (10 hr flight).    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    SOCIAL HISTORY: Non-Smoker/No ETOH/ No Ilicit Drug use.    FAMILY HISTORY:  Family history of CABG (Mother)  Family history of myocardial infarction (Mother, Sibling)      Allergies  aspirin (Other (Moderate); Swelling)    Home Medications:  allopurinol 100 mg oral tablet: 1 tab(s) orally once a day (26 Oct 2018 11:15)  atorvastatin 20 mg oral tablet: 1 tab(s) orally once a day (26 Oct 2018 11:15)  bisoprolol: 2.5 milligram(s) orally once a day (26 Oct 2018 11:15)  Plavix 75 mg oral tablet: 1 tab(s) orally once a day (26 Oct 2018 11:15)    HOSPITAL MEDICATIONS:   MEDICATIONS  (STANDING):  allopurinol 100 milliGRAM(s) Oral daily  atorvastatin 20 milliGRAM(s) Oral at bedtime  bisoprolol   Tablet 2.5 milliGRAM(s) Oral daily  clopidogrel Tablet 75 milliGRAM(s) Oral daily    MEDICATIONS  (PRN):      REVIEW OF SYSTEMS: 13 systems were reviewed and all negative except for comments above.    Vital Signs Last 24 Hrs  T(C): 36.7 (28 Oct 2018 09:30), Max: 36.8 (28 Oct 2018 07:27)  T(F): 98.1 (28 Oct 2018 09:30), Max: 98.3 (28 Oct 2018 07:27)  HR: 75 (28 Oct 2018 09:30) (72 - 102)  BP: 128/61 (28 Oct 2018 09:30) (120/76 - 154/85)  BP(mean): --  RR: 18 (28 Oct 2018 09:30) (18 - 19)  SpO2: 97% (28 Oct 2018 09:30) (97% - 100%)Daily Height in cm: 157 (28 Oct 2018 09:30)    Daily I&O's Summary      PHYSICAL EXAM:  Constitutional: NAD, awake and alert, well-developed  HEENT: PERRLA, EOMI,  No oral cyanosis. Oropharynx Clean and Dry.  Neck:  supple,  No JVD, No Thyroid enlargement. No Carotid Bruits bilaterally.  Respiratory: Breath sounds are clear bilaterally, No wheezing, rales or rhonchi  Cardiovascular: NL S1 and S2, RRR, no Murmurs, gallops or rubs  Gastrointestinal: Bowel Sounds present, soft, NT, ND, No HSM.   Extremities: No peripheral edema. No clubbing or cyanosis.  Barbeau Test performed in RUE and Negative.   Vascular: 2+ peripheral pulses in LE   Neurological: A/O x 3, no focal motor deficits  Musculoskeletal: no calf tenderness.  Skin: No rashes.    LABS: All Labs Reviewed:                        11.9   7.35  )-----------( 174      ( 28 Oct 2018 03:57 )             36.0                         12.8   5.99  )-----------( 192      ( 26 Oct 2018 12:18 )             38.4     28 Oct 2018 03:57    140    |  108    |  19     ----------------------------<  105    5.2     |  26     |  0.74   26 Oct 2018 12:18    140    |  106    |  15     ----------------------------<  90     4.2     |  26     |  0.65     Ca    9.2        28 Oct 2018 03:57  Ca    9.4        26 Oct 2018 12:18    TPro  7.7    /  Alb  3.7    /  TBili  0.3    /  DBili  x      /  AST  22     /  ALT  22     /  AlkPhos  80     28 Oct 2018 03:57      CARDIAC MARKERS ( 28 Oct 2018 08:53 )  0.184 ng/mL / x     / x     / x     / x      CARDIAC MARKERS ( 28 Oct 2018 03:57 )  0.221 ng/mL / x     / x     / x     / x        RADIOLOGY:  < from: Xray Chest 2 Views PA/Lat (10.28.18 @ 03:52) >  PROCEDURE DATE:  10/28/2018          INTERPRETATION:  Exam Date: 10/28/2018 3:52 AM    History: Chest pain    Technique: Frontal and lateral views of the chest with comparison to    9/24/2013    Findings:    The heart is normal in size.  The lungs are grossly clear. The apices and   hemidiaphragms are unremarkable. Degenerative changes of the visualized   osseous structures.    Impression:    No acute disease    No significant interval changeas compared to  9/24/2013    < end of copied text >    < from: NM Pulmonary Ventilation/Perfusion Scan (10.28.18 @ 14:42) >  IMPRESSION: Abnormal ventilation/perfusion lung scan. Very low   probability of pulmonary embolus.    < end of copied text >    EKG:  < from: 12 Lead ECG (10.28.18 @ 10:06) >  Diagnosis Line Normal sinus rhythm  Normal ECG    < end of copied text >    ECHO:  < from: TTE Echo Complete w/Doppler (12.30.15 @ 10:52) >   1. Normal global left ventricular systolic function.   2. Left ventricular ejection fraction, by visual estimation, is 65 to   70%.   3. Mild mitral valve regurgitation.   4. There is no evidenceof pericardial effusion.    < end of copied text >    CARDIAC CATHTERIZATION/STRESS TEST:  < from: Cardiac Cath Lab - Adult (10.26.18 @ 14:05) >  Angiographic Findings     Cardiac Arteries and Lesion Findings    LMCA: Diffuse irregularity.There is mild diffuse disease noted.    LAD: Diffuse irregularity.     Prox LAD: Distal subsection.0% stenosis 16 mm length.Pre procedure DOT   III flow was noted. The lesion was diagnosed as a low risk lesion.     The lesion was previouslytreated with the following devices: drug   eluting   stent.     Prox LAD: Proximal subsection.25% stenosis 8 mm length.Pre procedure DOT   III flow was noted. The lesion was diagnosed as a moderate risk lesion.    LCx: Diffuse irregularity.     Mid CX: Mid subsection.20% stenosis 30 mm length.Pre procedure DOT III   flow was noted. Good runoff was present.The lesion was diagnosed as a low   risk lesion.    RCA: Diffuse irregularity.     Mid RCA: 60% stenosis.    FFR  +------------------+-------------------------------+-----------------------  +  !FFR               !Stage/Medication               !Dosage                   !  +------------------+-------------------------------+-----------------------  +  !0.83              !Adenosine (I.V)            !140                      !  +------------------+-------------------------------+-----------------------  +    Valves  +------+----------------------------+----------------------------+---------  +  !Valve !Stenosis                    !Insufficiency                 !Comments !  +------+----------------------------+----------------------------+---------  +  !Aortic!No                          !Not assessed                !           !  +------+----------------------------+----------------------------+---------  +  !Mitral!Not assessed                !No insufficiency            !           !  +------+----------------------------+----------------------------+---------  +    LV function assessed as:Normal.  Ejection Fraction  +----------------------------------------------------------------------+---  +  !Method                                                                  !EF%!  +----------------------------------------------------------------------+---  +  !LV gram                                                      !60 !  +----------------------------------------------------------------------+---  +    VA  Ventriculography Findings:  Normal left ventricular systolic function.     Impression     Diagnostic Conclusions     2 Vessel CAD with NL LV FX. patent stent in LAD and moderate RCA disease   with negative FFR.    < end of copied text >

## 2018-10-29 LAB
BLD GP AB SCN SERPL QL: SIGNIFICANT CHANGE UP
BLD GP AB SCN SERPL QL: SIGNIFICANT CHANGE UP
TYPE + AB SCN PNL BLD: SIGNIFICANT CHANGE UP
TYPE + AB SCN PNL BLD: SIGNIFICANT CHANGE UP

## 2018-10-29 PROCEDURE — 93010 ELECTROCARDIOGRAM REPORT: CPT | Mod: 77

## 2018-10-29 PROCEDURE — 99233 SBSQ HOSP IP/OBS HIGH 50: CPT | Mod: 25

## 2018-10-29 PROCEDURE — 93010 ELECTROCARDIOGRAM REPORT: CPT

## 2018-10-29 PROCEDURE — 92978 ENDOLUMINL IVUS OCT C 1ST: CPT | Mod: 26,XU

## 2018-10-29 PROCEDURE — 92928 PRQ TCAT PLMT NTRAC ST 1 LES: CPT | Mod: RC

## 2018-10-29 RX ORDER — ALLOPURINOL 300 MG
100 TABLET ORAL DAILY
Qty: 0 | Refills: 0 | Status: DISCONTINUED | OUTPATIENT
Start: 2018-10-30 | End: 2018-11-10

## 2018-10-29 RX ORDER — BISOPROLOL FUMARATE 10 MG/1
2.5 TABLET, FILM COATED ORAL DAILY
Qty: 0 | Refills: 0 | Status: DISCONTINUED | OUTPATIENT
Start: 2018-10-30 | End: 2018-11-10

## 2018-10-29 RX ORDER — ASPIRIN/CALCIUM CARB/MAGNESIUM 324 MG
162 TABLET ORAL ONCE
Qty: 0 | Refills: 0 | Status: COMPLETED | OUTPATIENT
Start: 2018-10-29 | End: 2018-10-29

## 2018-10-29 RX ORDER — SODIUM CHLORIDE 9 MG/ML
1000 INJECTION INTRAMUSCULAR; INTRAVENOUS; SUBCUTANEOUS
Qty: 0 | Refills: 0 | Status: DISCONTINUED | OUTPATIENT
Start: 2018-10-29 | End: 2018-10-30

## 2018-10-29 RX ORDER — ALPRAZOLAM 0.25 MG
0.25 TABLET ORAL EVERY 6 HOURS
Qty: 0 | Refills: 0 | Status: DISCONTINUED | OUTPATIENT
Start: 2018-10-29 | End: 2018-10-30

## 2018-10-29 RX ORDER — ACETAMINOPHEN 500 MG
650 TABLET ORAL EVERY 6 HOURS
Qty: 0 | Refills: 0 | Status: DISCONTINUED | OUTPATIENT
Start: 2018-10-29 | End: 2018-11-10

## 2018-10-29 RX ORDER — SODIUM CHLORIDE 9 MG/ML
1000 INJECTION INTRAMUSCULAR; INTRAVENOUS; SUBCUTANEOUS
Qty: 0 | Refills: 0 | Status: DISCONTINUED | OUTPATIENT
Start: 2018-10-29 | End: 2018-10-29

## 2018-10-29 RX ORDER — ACETAMINOPHEN 500 MG
650 TABLET ORAL EVERY 6 HOURS
Qty: 0 | Refills: 0 | Status: DISCONTINUED | OUTPATIENT
Start: 2018-10-29 | End: 2018-10-30

## 2018-10-29 RX ORDER — ATORVASTATIN CALCIUM 80 MG/1
20 TABLET, FILM COATED ORAL AT BEDTIME
Qty: 0 | Refills: 0 | Status: DISCONTINUED | OUTPATIENT
Start: 2018-10-29 | End: 2018-11-10

## 2018-10-29 RX ORDER — ASPIRIN/CALCIUM CARB/MAGNESIUM 324 MG
325 TABLET ORAL ONCE
Qty: 0 | Refills: 0 | Status: COMPLETED | OUTPATIENT
Start: 2018-10-29 | End: 2018-10-29

## 2018-10-29 RX ORDER — CLOPIDOGREL BISULFATE 75 MG/1
150 TABLET, FILM COATED ORAL ONCE
Qty: 0 | Refills: 0 | Status: COMPLETED | OUTPATIENT
Start: 2018-10-29 | End: 2018-10-29

## 2018-10-29 RX ORDER — EPINEPHRINE 0.3 MG/.3ML
0.3 INJECTION INTRAMUSCULAR; SUBCUTANEOUS ONCE
Qty: 0 | Refills: 0 | Status: DISCONTINUED | OUTPATIENT
Start: 2018-10-29 | End: 2018-10-30

## 2018-10-29 RX ORDER — DIPHENHYDRAMINE HCL 50 MG
50 CAPSULE ORAL ONCE
Qty: 0 | Refills: 0 | Status: COMPLETED | OUTPATIENT
Start: 2018-10-29 | End: 2018-10-29

## 2018-10-29 RX ORDER — CHOLECALCIFEROL (VITAMIN D3) 125 MCG
2000 CAPSULE ORAL DAILY
Qty: 0 | Refills: 0 | Status: DISCONTINUED | OUTPATIENT
Start: 2018-10-29 | End: 2018-10-30

## 2018-10-29 RX ORDER — ASPIRIN/CALCIUM CARB/MAGNESIUM 324 MG
81 TABLET ORAL ONCE
Qty: 0 | Refills: 0 | Status: COMPLETED | OUTPATIENT
Start: 2018-10-29 | End: 2018-10-29

## 2018-10-29 RX ADMIN — Medication 0.25 MILLIGRAM(S): at 23:54

## 2018-10-29 RX ADMIN — CLOPIDOGREL BISULFATE 150 MILLIGRAM(S): 75 TABLET, FILM COATED ORAL at 12:25

## 2018-10-29 RX ADMIN — Medication 81 MILLIGRAM(S): at 21:16

## 2018-10-29 RX ADMIN — Medication 100 MILLIGRAM(S): at 12:25

## 2018-10-29 RX ADMIN — Medication 162 MILLIGRAM(S): at 21:30

## 2018-10-29 RX ADMIN — SODIUM CHLORIDE 41.67 MILLILITER(S): 9 INJECTION INTRAMUSCULAR; INTRAVENOUS; SUBCUTANEOUS at 19:13

## 2018-10-29 RX ADMIN — BISOPROLOL FUMARATE 2.5 MILLIGRAM(S): 10 TABLET, FILM COATED ORAL at 06:37

## 2018-10-29 RX ADMIN — Medication 50 MILLIGRAM(S): at 19:01

## 2018-10-29 RX ADMIN — Medication 325 MILLIGRAM(S): at 21:46

## 2018-10-29 RX ADMIN — ATORVASTATIN CALCIUM 20 MILLIGRAM(S): 80 TABLET, FILM COATED ORAL at 23:54

## 2018-10-29 NOTE — CHART NOTE - NSCHARTNOTEFT_GEN_A_CORE
asked to evaluate patient c/o acute chest pressure    Patient is s/p RCA stent and had aspirin desensitization protocol completed.    Had c/o chest heaviness and pressure.  Unable to grade it for me and was unable to say what made it better or what made it worse    Patient denied nausea.  Wants to eat now.   Denied neck, back or abd pain    Vital Signs Last 24 Hrs  T(C): 36.4 (29 Oct 2018 17:48), Max: 36.9 (29 Oct 2018 11:10)  T(F): 97.6 (29 Oct 2018 17:48), Max: 98.4 (29 Oct 2018 11:10)  HR: 58 (29 Oct 2018 22:00) (58 - 82)  BP: 104/59 (29 Oct 2018 22:00) (86/70 - 128/61)  BP(mean): 70 (29 Oct 2018 22:00) (60 - 80)  RR: 15 (29 Oct 2018 22:00) (12 - 20)  SpO2: 98% (29 Oct 2018 22:00) (96% - 100%)    Patient appears comfortable, although a little anxious w family at bedside, in NAD; sneezed and felt complete relief of heaviness  chest clear breath sounds bilaterally  Cor RR S1 + S2  Abd + bowel sounds soft  Skin warm and dry      EKG no acute changes when compared to prior EKG      Acute chest heaviness- resolved  noncardiac  suspect element of aerophagia  mild anxiety due to repeat admission for cath and is s/p above protocol   1. counselled to recall if any discomfort  2. xanax 0.25 mg po q 6 hrs prn anxiety; taking 1 tonight at HS to facilitate rest  3. discussed w patient and family and staff

## 2018-10-29 NOTE — PROGRESS NOTE ADULT - SUBJECTIVE AND OBJECTIVE BOX
HPI:  This is a 68 y.o female with PMHx of HTN, HLD, CAD, s/p PCI to LAD (6/2005), recent diagnostic cath on 10/26/18 (found RCA disease, FFR: 0.8 who recently admitted to [facility] with c/o CP/SOB and discharged (negative stress/Echo/ trop) presented to cardiologist office with c/o 2 weeks of worsening IZQUIERDO associated with palpitation alleviated with rest.     ROS: denies chest pain/ pressure, SOB or palpiation     Physical exam:   General: awake, no acute distress   HEENT: NCAT, neck supple   CV: RRR, normal S1S2, no murmur/ rub   Pulmonary: clear, no wheezing or rales   GI: +BS, soft, non-tender, non-distended   : voiding freely   Extremites: no edema, + pedal pulses   Skin: no rashes or lesion. Rt. femoral access site with perclose; no hematoma or bleeding       #     - CICU monitor   - IV hydration  - Labs and EKG in am   - post procedure, outcome and follow up care reviewed with patient/MD  - continue ASA and Plavix   - continue ACEI/ BB  - continue statin  - possible discharge home in am if medically stable  - follow up with MD in 4-7 days HPI:  This is a 68 y.o female with PMHx of HTN, HLD, CAD, s/p PCI to LAD (6/2005), recent diagnostic cath on 10/26/18 with RCA disease (FFR: 0.83) and pt was dischrged with Ranexa. Pt came to ED with NSTEMI on 10/28/2018. Pt presented to Cath lab for PCI to RCA. Now, Pt is s/p PCI with JAIR x1 to RCA.     ROS: denies chest pain/ pressure, SOB or palpitation     Physical exam:   General: awake, no acute distress   HEENT: NCAT, neck supple   CV: RRR, normal S1S2, no murmur/ rub   Pulmonary: clear, no wheezing or rales   GI: +BS, soft, non-tender, non-distended   : voiding freely   Extremities no edema, + pedal pulses   Skin: no rashes or lesion. Rt. Radial artery access site with radial band (to be removed at 18:15pm): no hematoma or bleeding       # s/p PCI with JAIR x1 to RCA   - return to CCU   - Labs and EKG in am   - post procedure, outcome and follow up care reviewed by Dr. Gutiérrez to patient/ family   - need ASPIRIN Desensitization tonight as protocol (spoke to pharmacy to be done)   - continue Plavix    - continue BB  - continue statin  - possible discharge home in am if medically stable  - follow up with Cardiologist in 4-7 days HPI:  This is a 68 y.o female with PMHx of HTN, HLD, CAD, s/p PCI to LAD (6/2005), recent diagnostic cath on 10/26/18 with RCA disease (FFR: 0.83) and pt was dischrged with Ranexa. Pt came to ED with NSTEMI on 10/28/2018. Pt presented to Cath lab for PCI to RCA. Now, Pt is s/p PCI with JAIR x1 to RCA.     ROS: denies chest pain/ pressure, SOB or palpitation     Physical exam:   General: awake, no acute distress   HEENT: NCAT, neck supple   CV: RRR, normal S1S2, no murmur/ rub   Pulmonary: clear, no wheezing or rales   GI: +BS, soft, non-tender, non-distended   : voiding freely   Extremities no edema, + pedal pulses   Skin: no rashes or lesion. Rt. Radial artery access site with radial band (to be removed at 18:15pm): no hematoma or bleeding       # s/p PCI with JAIR x1 to RCA   - return to CCU   - Labs and EKG in am   - post procedure, outcome and follow up care reviewed by Dr. Gutiérrez to patient/ family   - need ASPIRIN Desensitization tonight as protocol (spoke to pharmacy to be done)   - continue Plavix    - continue BB  - continue statin  - discontinue home med, Ranexa   - possible discharge home in am if medically stable  - follow up with Cardiologist in 4-7 days

## 2018-10-29 NOTE — PACU DISCHARGE NOTE - COMMENTS
patient discharged to CCU.  RIght Radial Band inplace. IVL site benign. Patient without any complaints. Vital signs stable. post procedure instructions given and understood by patient and family via teach back. Will continue to monitor patient status. patient discharged to CCU.  RIght Radial Band inplace. IVL site benign. Patient without any complaints. Vital signs stable. post procedure instructions given and understood by patient and family via teach back. Will continue to monitor patient status. Report given to MARY Davis

## 2018-10-29 NOTE — PROGRESS NOTE ADULT - ASSESSMENT
PROBLEMS:    Cold sensation to the neck and jaw with SOB and palpitations  LHC RCA 60% stenosis, non obstructive CAD-positive troponins   Recent h/o travel neg duplex of lower extermities & VQ scan     PLAN:    CARDIC cath with stent today  pulmonary optimized  continue current rx  DVT prophylasix

## 2018-10-30 ENCOUNTER — TRANSCRIPTION ENCOUNTER (OUTPATIENT)
Age: 68
End: 2018-10-30

## 2018-10-30 VITALS — DIASTOLIC BLOOD PRESSURE: 57 MMHG | HEART RATE: 76 BPM | SYSTOLIC BLOOD PRESSURE: 101 MMHG

## 2018-10-30 LAB
ANION GAP SERPL CALC-SCNC: 7 MMOL/L — SIGNIFICANT CHANGE UP (ref 5–17)
BUN SERPL-MCNC: 14 MG/DL — SIGNIFICANT CHANGE UP (ref 7–23)
CALCIUM SERPL-MCNC: 9.4 MG/DL — SIGNIFICANT CHANGE UP (ref 8.5–10.1)
CHLORIDE SERPL-SCNC: 109 MMOL/L — HIGH (ref 96–108)
CO2 SERPL-SCNC: 24 MMOL/L — SIGNIFICANT CHANGE UP (ref 22–31)
CREAT SERPL-MCNC: 0.62 MG/DL — SIGNIFICANT CHANGE UP (ref 0.5–1.3)
GLUCOSE SERPL-MCNC: 92 MG/DL — SIGNIFICANT CHANGE UP (ref 70–99)
HCT VFR BLD CALC: 35.8 % — SIGNIFICANT CHANGE UP (ref 34.5–45)
HGB BLD-MCNC: 11.9 G/DL — SIGNIFICANT CHANGE UP (ref 11.5–15.5)
MCHC RBC-ENTMCNC: 30 PG — SIGNIFICANT CHANGE UP (ref 27–34)
MCHC RBC-ENTMCNC: 33.2 GM/DL — SIGNIFICANT CHANGE UP (ref 32–36)
MCV RBC AUTO: 90.2 FL — SIGNIFICANT CHANGE UP (ref 80–100)
NRBC # BLD: 0 /100 WBCS — SIGNIFICANT CHANGE UP (ref 0–0)
PLATELET # BLD AUTO: 168 K/UL — SIGNIFICANT CHANGE UP (ref 150–400)
POTASSIUM SERPL-MCNC: 4.3 MMOL/L — SIGNIFICANT CHANGE UP (ref 3.5–5.3)
POTASSIUM SERPL-SCNC: 4.3 MMOL/L — SIGNIFICANT CHANGE UP (ref 3.5–5.3)
RBC # BLD: 3.97 M/UL — SIGNIFICANT CHANGE UP (ref 3.8–5.2)
RBC # FLD: 12.1 % — SIGNIFICANT CHANGE UP (ref 10.3–14.5)
SODIUM SERPL-SCNC: 140 MMOL/L — SIGNIFICANT CHANGE UP (ref 135–145)
WBC # BLD: 7.28 K/UL — SIGNIFICANT CHANGE UP (ref 3.8–10.5)
WBC # FLD AUTO: 7.28 K/UL — SIGNIFICANT CHANGE UP (ref 3.8–10.5)

## 2018-10-30 PROCEDURE — 99232 SBSQ HOSP IP/OBS MODERATE 35: CPT

## 2018-10-30 PROCEDURE — 93010 ELECTROCARDIOGRAM REPORT: CPT

## 2018-10-30 RX ORDER — BISOPROLOL FUMARATE 10 MG/1
2.5 TABLET, FILM COATED ORAL
Qty: 0 | Refills: 0 | COMMUNITY

## 2018-10-30 RX ORDER — ASPIRIN/CALCIUM CARB/MAGNESIUM 324 MG
1 TABLET ORAL
Qty: 0 | Refills: 0 | COMMUNITY
Start: 2018-10-30

## 2018-10-30 RX ORDER — ASPIRIN/CALCIUM CARB/MAGNESIUM 324 MG
81 TABLET ORAL DAILY
Qty: 0 | Refills: 0 | Status: DISCONTINUED | OUTPATIENT
Start: 2018-10-30 | End: 2018-10-30

## 2018-10-30 RX ORDER — NITROGLYCERIN 6.5 MG
1 CAPSULE, EXTENDED RELEASE ORAL
Qty: 100 | Refills: 0 | OUTPATIENT
Start: 2018-10-30

## 2018-10-30 RX ORDER — ASPIRIN/CALCIUM CARB/MAGNESIUM 324 MG
1 TABLET ORAL
Qty: 30 | Refills: 0 | OUTPATIENT
Start: 2018-10-30 | End: 2018-11-28

## 2018-10-30 RX ORDER — SODIUM CHLORIDE 9 MG/ML
500 INJECTION INTRAMUSCULAR; INTRAVENOUS; SUBCUTANEOUS ONCE
Qty: 0 | Refills: 0 | Status: COMPLETED | OUTPATIENT
Start: 2018-10-30 | End: 2018-10-30

## 2018-10-30 RX ORDER — CHOLECALCIFEROL (VITAMIN D3) 125 MCG
2 CAPSULE ORAL
Qty: 0 | Refills: 0 | COMMUNITY
Start: 2018-10-30

## 2018-10-30 RX ORDER — OMEGA-3 ACID ETHYL ESTERS 1 G
400 CAPSULE ORAL
Qty: 0 | Refills: 0 | COMMUNITY

## 2018-10-30 RX ADMIN — CLOPIDOGREL BISULFATE 75 MILLIGRAM(S): 75 TABLET, FILM COATED ORAL at 11:27

## 2018-10-30 RX ADMIN — SODIUM CHLORIDE 1000 MILLILITER(S): 9 INJECTION INTRAMUSCULAR; INTRAVENOUS; SUBCUTANEOUS at 11:52

## 2018-10-30 RX ADMIN — Medication 81 MILLIGRAM(S): at 11:27

## 2018-10-30 RX ADMIN — Medication 2000 UNIT(S): at 11:27

## 2018-10-30 RX ADMIN — Medication 100 MILLIGRAM(S): at 11:27

## 2018-10-30 NOTE — PROGRESS NOTE ADULT - PROBLEM SELECTOR PLAN 1
stabilized and s/p PCI of RCA. aspirin desensitization successful. Now on aspirin and plavix.  BP low NL this AM.  Will hold bisoprolol till thursday and then resume.  Ok for dc later today after aspirin dosing today.

## 2018-10-30 NOTE — PROGRESS NOTE ADULT - ASSESSMENT
PROBLEMS:    Cold sensation to the neck and jaw with SOB and palpitations  LHC RCA 60% stenosis, non obstructive CAD-positive troponins   Recent h/o travel neg duplex of lower extermities & VQ scan     PLAN:    s/p CARDIC cath with stent   pulmonary optimized  continue current rx  DVT prophylasix

## 2018-10-30 NOTE — DISCHARGE NOTE ADULT - CARE PLAN
Principal Discharge DX:	ACS (acute coronary syndrome)  Goal:	continue Aspirin and other cardiac meds  Assessment and plan of treatment:	Outpatient f/u with Dr Gutiérrez

## 2018-10-30 NOTE — DISCHARGE NOTE ADULT - HOSPITAL COURSE
CC- SOB  HPI:  68F.  admitted 10/28/18.  family at bedside.  presented to ED c/o a cold sensation to the neck and jaw.  onset 30 minutes prior to taking new medication.  daughter gave sl NTG.  in ED, CE ordered.  TnI elevated. Recently admitted and discharged 10/25-26/18 from .  patient presented at that time with SOB and palpitations for 2 weeks.  LHC RCA 60% stenosis, non obstructive CAD.  medical management and lifestyle modifications were advised.  Ranexa 500mggpo bid was started.  patient was to follow up with Dr. Palla in 1-2 weeks.  Hospital stay- underwent card cath and stent to PCA. Was in CCU for aspirin desensitization protocol. Stable for discharge once protocol completed  Review of system- All 10 systems reviewed and is as per HPI otherwise negative.     T(C): 36.4 (10-30-18 @ 07:41), Max: 37.1 (10-30-18 @ 05:00)  HR: 73 (10-30-18 @ 08:00) (58 - 82)  BP: 92/70 (10-30-18 @ 08:00) (86/70 - 128/61)  RR: 23 (10-30-18 @ 08:00) (12 - 23)  SpO2: 98% (10-30-18 @ 07:00) (96% - 100%)  Wt(kg): --  LABS:                        11.9   7.28  )-----------( 168      ( 30 Oct 2018 04:59 )             35.8     140  |  109<H>  |  14  ----------------------------<  92  4.3   |  24  |  0.62  Ca    9.4      30 Oct 2018 04:59    PHYSICAL EXAM:  GENERAL: NAD, well-groomed, well-developed  HEAD:  Atraumatic, Normocephalic  EYES: EOMI, PERRLA, conjunctiva and sclera clear  HEENT: Moist mucous membranes  NECK: Supple, No JVD  NERVOUS SYSTEM:  Alert & Oriented X3, Motor Strength 5/5 B/L upper and lower extremities; DTRs 2+ intact and symmetric  CHEST/LUNG: Clear to auscultation bilaterally; No rales, rhonchi, wheezing, or rubs  HEART: Regular rate and rhythm; No murmurs, rubs, or gallops  ABDOMEN: Soft, Nontender, Nondistended; Bowel sounds present  GENITOURINARY- Voiding, no palpable bladder  EXTREMITIES:  2+ Peripheral Pulses, No clubbing, cyanosis, or edema  MUSCULOSKELTAL- No muscle tenderness, Muscle tone normal, No joint tenderness, no Joint swelling, Joint range of motion-normal  SKIN-no rash, no lesion  CNS- alert, oriented X3, non focal     Assessment/Plan CC- SOB  HPI:  68F.  admitted 10/28/18.  family at bedside.  presented to ED c/o a cold sensation to the neck and jaw.  onset 30 minutes prior to taking new medication.  daughter gave sl NTG.  in ED, CE ordered.  TnI elevated. Recently admitted and discharged 10/25-26/18 from .  patient presented at that time with SOB and palpitations for 2 weeks.  LHC RCA 60% stenosis, non obstructive CAD.  medical management and lifestyle modifications were advised.  Ranexa 500mggpo bid was started.  patient was to follow up with Dr. Palla in 1-2 weeks.  Hospital stay- underwent card cath and stent to PCA. Was in CCU for aspirin desensitization protocol. Stable for discharge once protocol completed  Review of system- All 10 systems reviewed and is as per HPI otherwise negative.     T(C): 36.4 (10-30-18 @ 07:41), Max: 37.1 (10-30-18 @ 05:00)  HR: 73 (10-30-18 @ 08:00) (58 - 82)  BP: 92/70 (10-30-18 @ 08:00) (86/70 - 128/61)  RR: 23 (10-30-18 @ 08:00) (12 - 23)  SpO2: 98% (10-30-18 @ 07:00) (96% - 100%)  Wt(kg): --  LABS:                        11.9   7.28  )-----------( 168      ( 30 Oct 2018 04:59 )             35.8     140  |  109<H>  |  14  ----------------------------<  92  4.3   |  24  |  0.62  Ca    9.4      30 Oct 2018 04:59    PHYSICAL EXAM:  GENERAL: NAD, well-groomed, well-developed  HEAD:  Atraumatic, Normocephalic  EYES: EOMI, PERRLA, conjunctiva and sclera clear  HEENT: Moist mucous membranes  NECK: Supple, No JVD  NERVOUS SYSTEM:  Alert & Oriented X3, Motor Strength 5/5 B/L upper and lower extremities; DTRs 2+ intact and symmetric  CHEST/LUNG: Clear to auscultation bilaterally; No rales, rhonchi, wheezing, or rubs  HEART: Regular rate and rhythm; No murmurs, rubs, or gallops  ABDOMEN: Soft, Nontender, Nondistended; Bowel sounds present  GENITOURINARY- Voiding, no palpable bladder  EXTREMITIES:  2+ Peripheral Pulses, No clubbing, cyanosis, or edema  MUSCULOSKELTAL- No muscle tenderness, Muscle tone normal, No joint tenderness, no Joint swelling, Joint range of motion-normal  SKIN-no rash, no lesion  CNS- alert, oriented X3, non focal     Assessment/Plan  #NSTEMI/ S/p PCA PCI  Once completed with aspirin desensitization protocol, can be discharged home  Aspirin, Plavix, Statin, resume BB on Thursday due to hypotension. No ACEI due to hypotension  #Dispo- home later on today

## 2018-10-30 NOTE — DISCHARGE NOTE ADULT - CARE PROVIDER_API CALL
Calvin Gutiérrez (MD), Cardiovascular Disease  [address] [place] [address]  Phone: [phone]  Fax: [phone]

## 2018-10-30 NOTE — DISCHARGE NOTE ADULT - MEDICATION SUMMARY - MEDICATIONS TO TAKE
I will START or STAY ON the medications listed below when I get home from the hospital:    aspirin 81 mg oral tablet, chewable  -- 1 tab(s) by mouth once a day  -- Indication: For CAD    allopurinol 100 mg oral tablet  -- 1 tab(s) by mouth once a day  -- Indication: For gout    atorvastatin 20 mg oral tablet  -- 1 tab(s) by mouth once a day  -- Indication: For CAD    Plavix 75 mg oral tablet  -- 1 tab(s) by mouth once a day  -- Indication: For CAD    bisoprolol  -- 2.5 milligram(s) by mouth once a day, resume on Thursday 11/1/18  -- Indication: For CAD    Vitamin D3 2000 intl units oral tablet  -- 2 tab(s) by mouth once a day  -- Indication: For vitamin I will START or STAY ON the medications listed below when I get home from the hospital:    aspirin 81 mg oral tablet, chewable  -- 1 tab(s) by mouth once a day  -- Indication: For cardiac    nitroglycerin 0.4 mg sublingual tablet  -- 1 tab(s) sublingually every 5 minutes, As Needed -for chest pain   -- It is very important that you take or use this exactly as directed.  Do not skip doses or discontinue unless directed by your doctor.    -- Indication: For prn for chest pain    allopurinol 100 mg oral tablet  -- 1 tab(s) by mouth once a day  -- Indication: For gout    atorvastatin 20 mg oral tablet  -- 1 tab(s) by mouth once a day  -- Indication: For CAD    Plavix 75 mg oral tablet  -- 1 tab(s) by mouth once a day  -- Indication: For CAD    bisoprolol  -- 2.5 milligram(s) by mouth once a day, resume on Thursday 11/1/18  -- Indication: For CAD    Vitamin D3 2000 intl units oral tablet  -- 2 tab(s) by mouth once a day  -- Indication: For vitamin

## 2018-10-30 NOTE — DISCHARGE NOTE ADULT - PATIENT PORTAL LINK FT
You can access the [facility] Patient Portal, offered by [facility], by registering with the following website: [email or web address]

## 2018-10-30 NOTE — PROGRESS NOTE ADULT - SUBJECTIVE AND OBJECTIVE BOX
Subjective:    pat s/p cardic stent, no pulmonary complaint.    Home Medications:  allopurinol 100 mg oral tablet: 1 tab(s) orally once a day (26 Oct 2018 11:15)  aspirin 81 mg oral tablet, chewable: 1 tab(s) orally once a day (30 Oct 2018 08:31)  atorvastatin 20 mg oral tablet: 1 tab(s) orally once a day (26 Oct 2018 11:15)  bisoprolol: 2.5 milligram(s) orally once a day, resume on Thursday 11/1/18 (30 Oct 2018 08:31)  Plavix 75 mg oral tablet: 1 tab(s) orally once a day (26 Oct 2018 11:15)  Vitamin D3 2000 intl units oral tablet: 2 tab(s) orally once a day (29 Oct 2018 17:13)    MEDICATIONS  (STANDING):  allopurinol 100 milliGRAM(s) Oral daily  aspirin  chewable 81 milliGRAM(s) Oral daily  atorvastatin 20 milliGRAM(s) Oral at bedtime  bisoprolol   Tablet 2.5 milliGRAM(s) Oral daily  cholecalciferol 2000 Unit(s) Oral daily  clopidogrel Tablet 75 milliGRAM(s) Oral daily  heparin  Injectable 5000 Unit(s) SubCutaneous every 8 hours  influenza   Vaccine 0.5 milliLiter(s) IntraMuscular once  sodium chloride 0.9%. 1000 milliLiter(s) (41.67 mL/Hr) IV Continuous <Continuous>    MEDICATIONS  (PRN):  acetaminophen   Tablet .. 650 milliGRAM(s) Oral every 6 hours PRN Moderate Pain (4 - 6)  ALPRAZolam 0.25 milliGRAM(s) Oral every 6 hours PRN anxiety  aluminum hydroxide/magnesium hydroxide/simethicone Suspension 30 milliLiter(s) Oral every 6 hours PRN Dyspepsia  EPINEPHrine     1 mG/mL Injectable 0.3 milliGRAM(s) IntraMuscular once PRN allergic reaction      Allergies    aspirin (Other (Moderate); Swelling)    Intolerances        Vital Signs Last 24 Hrs  T(C): 36.4 (30 Oct 2018 07:41), Max: 37.1 (30 Oct 2018 05:00)  T(F): 97.5 (30 Oct 2018 07:41), Max: 98.7 (30 Oct 2018 05:00)  HR: 73 (30 Oct 2018 08:00) (58 - 82)  BP: 92/70 (30 Oct 2018 08:00) (86/70 - 128/61)  BP(mean): 75 (30 Oct 2018 08:00) (60 - 85)  RR: 23 (30 Oct 2018 08:00) (12 - 23)  SpO2: 98% (30 Oct 2018 07:00) (96% - 100%)      PHYSICAL EXAMINATION:    NECK:  Supple. No lymphadenopathy. Jugular venous pressure not elevated. Carotids equal.   HEART:   The cardiac impulse has a normal quality. Reg., Nl S1 and S2.  There are no murmurs, rubs or gallops noted  CHEST:  Chest is clear to auscultation. Normal respiratory effort.  ABDOMEN:  Soft and nontender.   EXTREMITIES:  There is no edema.       LABS:                        11.9   7.28  )-----------( 168      ( 30 Oct 2018 04:59 )             35.8     10-30    140  |  109<H>  |  14  ----------------------------<  92  4.3   |  24  |  0.62    Ca    9.4      30 Oct 2018 04:59
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 1000 milliLiter(s) (41.67 mL/Hr) IV Continuous <Continuous>    MEDICATIONS  (PRN):  acetaminophen   Tablet .. 650 milliGRAM(s) Oral every 6 hours PRN Moderate Pain (4 - 6)  ALPRAZolam 0.25 milliGRAM(s) Oral every 6 hours PRN anxiety  aluminum hydroxide/magnesium hydroxide/simethicone Suspension 30 milliLiter(s) Oral every 6 hours PRN Dyspepsia  EPINEPHrine     1 mG/mL Injectable 0.3 milliGRAM(s) IntraMuscular once PRN allergic reaction      Allergies    aspirin (Other (Moderate); Swelling)      REVIEW OF SYSTEMS: As mentioned in HPI all others Negative     Vital Signs Last 24 Hrs  T(C): 37.1 (30 Oct 2018 05:00), Max: 37.1 (30 Oct 2018 05:00)  T(F): 98.7 (30 Oct 2018 05:00), Max: 98.7 (30 Oct 2018 05:00)  HR: 62 (30 Oct 2018 06:00) (58 - 82)  BP: 91/52 (30 Oct 2018 06:00) (86/70 - 128/61)  BP(mean): 61 (30 Oct 2018 06:00) (60 - 80)  RR: 16 (30 Oct 2018 06:00) (12 - 22)  SpO2: 97% (30 Oct 2018 06:00) (96% - 100%)    PHYSICAL EXAM:  NERVOUS SYSTEM:  Alert & Oriented X3  CHEST/LUNG: Clear to auscultation bilaterally  HEART: Regular rate and rhythm; No murmurs  ABDOMEN: Soft, Nontender, Bowel sounds present  EXTREMITIES:  + Peripheral Pulses, No  edema  SKIN: right radial cath site without bleeding or hematoma     LABS:                        11.9   7.28  )-----------( 168      ( 30 Oct 2018 04:59 )             35.8     10-30    140  |  109<H>  |  14  ----------------------------<  92  4.3   |  24  |  0.62    Ca    9.4      30 Oct 2018 04:59
HPI:  68F.  admitted 10/28/18.  family at bedside.  presented to ED c/o a cold sensation to the neck and jaw.  onset 30 minutes prior to taking new medication.  daughter gave sl NTG.  in ED, CE ordered.  TnI elevated.    recently admitted and discharged 10/25-26/18 from .  patient presented at that time with SOB and palpitations for 2 weeks.  LHC RCA 60% stenosis, non obstructive CAD.  medical management and lifestyle modifications were advised.  Ranexa 500mggpo bid was started.   Pt referred for PCI of RCA d/t persistent angina symptoms.    Allergy :  aspirin (Other (Moderate); Swelling).    s/p LHC : JAIR to RCA  Pt denies chest pain/SOB/palpitations post cath.    Physical exam:  Vital Signs :  T(C): 36.4 (29 Oct 2018 17:48), Max: 36.9 (29 Oct 2018 11:10)  T(F): 97.6 (29 Oct 2018 17:48), Max: 98.4 (29 Oct 2018 11:10)  HR: 67 (29 Oct 2018 18:00) (60 - 82)  BP: 127/65 (29 Oct 2018 18:00) (99/64 - 128/61)  BP(mean): 80 (29 Oct 2018 18:00) (76 - 80)  RR: 15 (29 Oct 2018 18:00) (12 - 18)  SpO2: 100% (29 Oct 2018 18:00) (97% - 100%)  Cardiac : (+)S1S2, RRR  Lungs: CTA B/L  Abd: soft, NT, (+)BS  Ext: Rt radial hemoband removed , 2+ Rt radial pulses    Labs:                        11.9   7.35  )-----------( 174      ( 28 Oct 2018 03:57 )             36.0     10-28    140  |  108  |  19  ----------------------------<  105<H>  5.2   |  26  |  0.74    Ca    9.2      28 Oct 2018 03:57    TPro  7.7  /  Alb  3.7  /  TBili  0.3  /  DBili  x   /  AST  22  /  ALT  22  /  AlkPhos  80  10-28      MEDICATIONS  (STANDING):  allopurinol 100 milliGRAM(s) Oral daily  aspirin 325 milliGRAM(s) Oral once  aspirin  chewable 81 milliGRAM(s) Oral once  aspirin  chewable 162 milliGRAM(s) Oral once  Aspirin 81 mg in 81 mL water 0.1 mG/Dose 0.1 milliGRAM(s) Oral once  Aspirin 81 mg in 81 mL water 0.3 mG/Dose 0.3 milliGRAM(s) Oral once  Aspirin 81 mg in 81 mL water 1 mG/Dose 1 milliGRAM(s) Oral once  Aspirin 81 mg in 81 mL water 10 mG/Dose 10 milliGRAM(s) Oral once  Aspirin 81 mg in 81 mL water 20 mG/Dose 20 milliGRAM(s) Oral once  Aspirin 81 mg in 81 mL water 3 milliGRAM(s) 3 milliGRAM(s) Oral once  Aspirin 81 mg in 81 mL water 40 mG/Dose 40 milliGRAM(s) Oral once  atorvastatin 20 milliGRAM(s) Oral at bedtime  bisoprolol   Tablet 2.5 milliGRAM(s) Oral daily  cholecalciferol 2000 Unit(s) Oral daily  clopidogrel Tablet 75 milliGRAM(s) Oral daily  diphenhydrAMINE 50 milliGRAM(s) Oral once  heparin  Injectable 5000 Unit(s) SubCutaneous every 8 hours  influenza   Vaccine 0.5 milliLiter(s) IntraMuscular once  sodium chloride 0.9%. 1000 milliLiter(s) (75 mL/Hr) IV Continuous <Continuous>      A/P : CAD, s/p JAIR to RCA  -monitor pt in CCU for Aspirin desensitization  -IV hydration  -continue ASA/plavix/b-blocker/statin  -Discussed therapeutic lifestyle changes to reduce risk factors such as following a cardiac diet, weight loss, maintaining a healthy weight, exercise, medication compliance, and regular follow-up  with MD to know our numbers (BP, cholesterol, weight, and glucose)  -f/u EKG/Labs/site check in AM  -d/c planning in AM if pt remains stable  -f/u with PMD in 1-2 weeks  -Plan discussed with pt/Dr. Gutiérrez/hospitalist.
HPI:  68F.  admitted 10/28/18.  presented to ED c/o a cold sensation to the neck and jaw with some diaphoresis.  Onset 30 minutes prior to taking new medication.  daughter gave sl NTG. TnI elevated. Had CC on 10/25/18 for atypical chest pain , SOB and palpitations for 2 weeks.  Cath showed patent LAD stent and 60% stenosis in RCA with FFR 0.83, Medical management with the addition of Ranexa 500mg po bid was started.  Patient started medication sat. am and symptoms came about sat. night. Had recent travel to [facility] weeks earlier (10 hr flight).    10/29/18: S/p PCI of RCA.  IVUS did show tighter stenosis than that appreciated on angiography. will under go aspirin desenstization tonight.  10/30/18: Tolerated aspirin sensitization without issue.  Denies CP or SOB.  Right radial access site sore but good pulse present.  minimal echymosis.    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    SOCIAL HISTORY: Non-Smoker/No ETOH/ No Ilicit Drug use.    FAMILY HISTORY:  Family history of CABG (Mother)  Family history of myocardial infarction (Mother, Sibling)      Allergies  aspirin (Other (Moderate); Swelling)    Home Medications:  allopurinol 100 mg oral tablet: 1 tab(s) orally once a day (26 Oct 2018 11:15)  atorvastatin 20 mg oral tablet: 1 tab(s) orally once a day (26 Oct 2018 11:15)  bisoprolol: 2.5 milligram(s) orally once a day (26 Oct 2018 11:15)  Plavix 75 mg oral tablet: 1 tab(s) orally once a day (26 Oct 2018 11:15)    REVIEW OF SYSTEMS: 13 systems were reviewed and all negative except for comments above.    MEDICATIONS  (STANDING):  allopurinol 100 milliGRAM(s) Oral daily  atorvastatin 20 milliGRAM(s) Oral at bedtime  bisoprolol   Tablet 2.5 milliGRAM(s) Oral daily  cholecalciferol 2000 Unit(s) Oral daily  clopidogrel Tablet 75 milliGRAM(s) Oral daily  heparin  Injectable 5000 Unit(s) SubCutaneous every 8 hours  influenza   Vaccine 0.5 milliLiter(s) IntraMuscular once  sodium chloride 0.9%. 1000 milliLiter(s) (41.67 mL/Hr) IV Continuous <Continuous>  aspirin 81mg QD    MEDICATIONS  (PRN):  acetaminophen   Tablet .. 650 milliGRAM(s) Oral every 6 hours PRN Moderate Pain (4 - 6)  ALPRAZolam 0.25 milliGRAM(s) Oral every 6 hours PRN anxiety  aluminum hydroxide/magnesium hydroxide/simethicone Suspension 30 milliLiter(s) Oral every 6 hours PRN Dyspepsia  EPINEPHrine     1 mG/mL Injectable 0.3 milliGRAM(s) IntraMuscular once PRN allergic reaction      Vital Signs Last 24 Hrs  T(C): 36.4 (30 Oct 2018 07:41), Max: 37.1 (30 Oct 2018 05:00)  T(F): 97.5 (30 Oct 2018 07:41), Max: 98.7 (30 Oct 2018 05:00)  HR: 64 (30 Oct 2018 07:00) (58 - 82)  BP: 99/81 (30 Oct 2018 07:00) (86/70 - 128/61)  BP(mean): 85 (30 Oct 2018 07:00) (60 - 85)  RR: 18 (30 Oct 2018 07:00) (12 - 22)  SpO2: 98% (30 Oct 2018 07:00) (96% - 100%)    I&O's Detail    29 Oct 2018 07:01  -  30 Oct 2018 07:00  --------------------------------------------------------  IN:    Oral Fluid: 240 mL    sodium chloride 0.9%.: 450 mL  Total IN: 690 mL    OUT:    Voided: 700 mL  Total OUT: 700 mL    Total NET: -10 mL          Daily     Daily Weight in k.8 (30 Oct 2018 02:00)    PHYSICAL EXAM:  Constitutional: NAD, awake and alert, well-developed  HEENT: PERRLA, EOMI,  No oral cyanosis. Oropharynx Clean and Dry.  Neck:  supple,  No JVD, No Thyroid enlargement. No Carotid Bruits bilaterally.  Respiratory: Breath sounds are clear bilaterally, No wheezing, rales or rhonchi  Cardiovascular: NL S1 and S2, RRR, no Murmurs, gallops or rubs  Gastrointestinal: Bowel Sounds present, soft, NT, ND, No HSM.   Extremities: No peripheral edema. No clubbing or cyanosis.  Barbeau Test performed in RUE and Negative.   Vascular: 2+ peripheral pulses in LE   Neurological: A/O x 3, no focal motor deficits  Musculoskeletal: no calf tenderness.  Skin: No rashes.  right radial access site clean and dry with good pulse. mild echymosis.  no hematoma.    LABS: All Labs Reviewed:                          11.9   7.28  )-----------( 168      ( 30 Oct 2018 04:59 )             35.8     10    140  |  109<H>  |  14  ----------------------------<  92  4.3   |  24  |  0.62    Ca    9.4      30 Oct 2018 04:59      CARDIAC MARKERS ( 28 Oct 2018 08:53 )  0.184 ng/mL / x     / x     / x     / x        RADIOLOGY:  < from: Xray Chest 2 Views PA/Lat (10.28.18 @ 03:52) >  PROCEDURE DATE:  10/28/2018          INTERPRETATION:  Exam Date: 10/28/2018 3:52 AM    History: Chest pain    Technique: Frontal and lateral views of the chest with comparison to    2013    Findings:    The heart is normal in size.  The lungs are grossly clear. The apices and   hemidiaphragms are unremarkable. Degenerative changes of the visualized   osseous structures.    Impression:    No acute disease    No significant interval changeas compared to  2013    < end of copied text >    < from: NM Pulmonary Ventilation/Perfusion Scan (10.28.18 @ 14:42) >  IMPRESSION: Abnormal ventilation/perfusion lung scan. Very low   probability of pulmonary embolus.    < end of copied text >    EKG:  < from: 12 Lead ECG (10.28.18 @ 10:06) >  Diagnosis Line Normal sinus rhythm  Normal ECG    < end of copied text >    ECHO:  < from: TTE Echo Complete w/Doppler (12.30.15 @ 10:52) >   1. Normal global left ventricular systolic function.   2. Left ventricular ejection fraction, by visual estimation, is 65 to   70%.   3. Mild mitral valve regurgitation.   4. There is no evidenceof pericardial effusion.    < end of copied text >    CARDIAC CATHTERIZATION/STRESS TEST:  < from: Cardiac Cath Lab - Adult (10.26.18 @ 14:05) >  Angiographic Findings     Cardiac Arteries and Lesion Findings    LMCA: Diffuse irregularity.There is mild diffuse disease noted.    LAD: Diffuse irregularity.     Prox LAD: Distal subsection.0% stenosis 16 mm length.Pre procedure DOT   III flow was noted. The lesion was diagnosed as a low risk lesion.     The lesion was previouslytreated with the following devices: drug   eluting   stent.     Prox LAD: Proximal subsection.25% stenosis 8 mm length.Pre procedure DOT   III flow was noted. The lesion was diagnosed as a moderate risk lesion.    LCx: Diffuse irregularity.     Mid CX: Mid subsection.20% stenosis 30 mm length.Pre procedure DOT III   flow was noted. Good runoff was present.The lesion was diagnosed as a low   risk lesion.    RCA: Diffuse irregularity.     Mid RCA: 60% stenosis.    FFR  +------------------+-------------------------------+-----------------------  +  !FFR               !Stage/Medication               !Dosage                   !  +------------------+-------------------------------+-----------------------  +  !0.83              !Adenosine (I.V)            !140                      !  +------------------+-------------------------------+-----------------------  +    Valves  +------+----------------------------+----------------------------+---------  +  !Valve !Stenosis                    !Insufficiency                 !Comments !  +------+----------------------------+----------------------------+---------  +  !Aortic!No                          !Not assessed                !           !  +------+----------------------------+----------------------------+---------  +  !Mitral!Not assessed                !No insufficiency            !           !  +------+----------------------------+----------------------------+---------  +    LV function assessed as:Normal.  Ejection Fraction  +----------------------------------------------------------------------+---  +  !Method                                                                  !EF%!  +----------------------------------------------------------------------+---  +  !LV gram                                                      !60 !  +----------------------------------------------------------------------+---  +    VA  Ventriculography Findings:  Normal left ventricular systolic function.     Impression     Diagnostic Conclusions     2 Vessel CAD with NL LV FX. patent stent in LAD and moderate RCA disease   with negative FFR.    < end of copied text >      < from: Cardiac Cath Lab - Adult (10.29.18 @ 15:34) >  Angiographic Findings     Cardiac Arteries and Lesion Findings    RCA:     Mid RCA: Mid subsection.70% stenosis 14 mm length reduced to 0%.Pre   procedure DOT III flow was noted. Good runoff was present.The lesion was   diagnosed as a moderate risk lesion.The lesion was discrete.The lesion   showed mild angulation and mild tortuosity.     Post Procedure DOT III flow was present.     Comments:Prior to intervention on RCA, one angiogram of left coronary   done   which was unchanged from Friday.     IVUS showed mid portion of stent under deployed and had napkin ring   stenosis. It was then post dilated with as 3.0 mm NC balloon. Vessel   diameter (approximately 3.9 mm).     Devices used     * 6FR AR1 LAUNCHER     * .014 x 190cm [place] XT     * Synergy JAIR Stent     Diameter: 2.5 mm. Length: 24 mm. Total duration: 23 sec.1 inflation(s).   to a max pressure of: 14 SRI.     * EAGLE EYE PLATINUM IVUS     * CHOICE PT 182CM     * 6FR HSI LAUNCHER     * CHOICE PT 182CM     * NC Euphora     Diameter: 3 mm. Length: 20 mm. Total duration: 35 sec.2 inflation(s). to   a max pressure of: 16 SRI.     Impression     Interventional Conclusions     Successful Coronary Intervention JAIR of RCA.     Recommendations     Manage with medical therapy.      < end of copied text >
HPI:  68F.  admitted 10/28/18.  presented to ED c/o a cold sensation to the neck and jaw with some diaphoresis.  Onset 30 minutes prior to taking new medication.  daughter gave sl NTG. TnI elevated. Had CC on 10/25/18 for atypical chest pain , SOB and palpitations for 2 weeks.  Cath showed patent LAD stent and 60% stenosis in RCA with FFR 0.83, Medical management with the addition of Ranexa 500mg po bid was started.  Patient started medication sat. am and symptoms came about sat. night. Had recent travel to [facility] weeks earlier (10 hr flight).    10/29/18: S/p PCI of RCA.  IVUS did show tighter stenosis than that appreciated on angiography. will under go aspirin desenstization tonight.    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    SOCIAL HISTORY: Non-Smoker/No ETOH/ No Ilicit Drug use.    FAMILY HISTORY:  Family history of CABG (Mother)  Family history of myocardial infarction (Mother, Sibling)      Allergies  aspirin (Other (Moderate); Swelling)    Home Medications:  allopurinol 100 mg oral tablet: 1 tab(s) orally once a day (26 Oct 2018 11:15)  atorvastatin 20 mg oral tablet: 1 tab(s) orally once a day (26 Oct 2018 11:15)  bisoprolol: 2.5 milligram(s) orally once a day (26 Oct 2018 11:15)  Plavix 75 mg oral tablet: 1 tab(s) orally once a day (26 Oct 2018 11:15)    REVIEW OF SYSTEMS: 13 systems were reviewed and all negative except for comments above.    MEDICATIONS  (STANDING):  allopurinol 100 milliGRAM(s) Oral daily  Aspirin 81 mg daily (desenstization tonight)  atorvastatin 20 milliGRAM(s) Oral at bedtime  bisoprolol   Tablet 2.5 milliGRAM(s) Oral daily  cholecalciferol 2000 Unit(s) Oral daily  clopidogrel Tablet 75 milliGRAM(s) Oral daily  heparin  Injectable 5000 Unit(s) SubCutaneous every 8 hours  influenza   Vaccine 0.5 milliLiter(s) IntraMuscular once  sodium chloride 0.9%. 1000 milliLiter(s) (41.67 mL/Hr) IV Continuous <Continuous>    MEDICATIONS  (PRN):  acetaminophen   Tablet .. 650 milliGRAM(s) Oral every 6 hours PRN Moderate Pain (4 - 6)  aluminum hydroxide/magnesium hydroxide/simethicone Suspension 30 milliLiter(s) Oral every 6 hours PRN Dyspepsia  EPINEPHrine     1 mG/mL Injectable 0.3 milliGRAM(s) IntraMuscular once PRN allergic reaction      Vital Signs Last 24 Hrs  T(C): 36.4 (29 Oct 2018 17:48), Max: 36.9 (29 Oct 2018 11:10)  T(F): 97.6 (29 Oct 2018 17:48), Max: 98.4 (29 Oct 2018 11:10)  HR: 73 (29 Oct 2018 19:45) (60 - 82)  BP: 114/57 (29 Oct 2018 19:45) (99/51 - 128/61)  BP(mean): 68 (29 Oct 2018 19:45) (60 - 80)  RR: 16 (29 Oct 2018 19:45) (12 - 19)  SpO2: 96% (29 Oct 2018 19:45) (96% - 100%)    I&O's Detail    28 Oct 2018 07:01  -  29 Oct 2018 07:00  --------------------------------------------------------  IN:    Oral Fluid: 120 mL  Total IN: 120 mL    OUT:  Total OUT: 0 mL    Total NET: 120 mL      29 Oct 2018 07:01  -  29 Oct 2018 20:19  --------------------------------------------------------  IN:    Oral Fluid: 240 mL  Total IN: 240 mL    OUT:  Total OUT: 0 mL    Total NET: 240 mL          Daily     Daily Weight in k.2 (29 Oct 2018 17:48)    PHYSICAL EXAM:  Constitutional: NAD, awake and alert, well-developed  HEENT: PERRLA, EOMI,  No oral cyanosis. Oropharynx Clean and Dry.  Neck:  supple,  No JVD, No Thyroid enlargement. No Carotid Bruits bilaterally.  Respiratory: Breath sounds are clear bilaterally, No wheezing, rales or rhonchi  Cardiovascular: NL S1 and S2, RRR, no Murmurs, gallops or rubs  Gastrointestinal: Bowel Sounds present, soft, NT, ND, No HSM.   Extremities: No peripheral edema. No clubbing or cyanosis.  Barbeau Test performed in RUE and Negative.   Vascular: 2+ peripheral pulses in LE   Neurological: A/O x 3, no focal motor deficits  Musculoskeletal: no calf tenderness.  Skin: No rashes.  right radial access site clean and dry with good pulse.    LABS: All Labs Reviewed:                           11.9   7.35  )-----------( 174      ( 28 Oct 2018 03:57 )             36.0     10-    140  |  108  |  19  ----------------------------<  105<H>  5.2   |  26  |  0.74    Ca    9.2      28 Oct 2018 03:57    TPro  7.7  /  Alb  3.7  /  TBili  0.3  /  DBili  x   /  AST  22  /  ALT  22  /  AlkPhos  80  10-    CARDIAC MARKERS ( 28 Oct 2018 08:53 )  0.184 ng/mL / x     / x     / x     / x      CARDIAC MARKERS ( 28 Oct 2018 03:57 )  0.221 ng/mL / x     / x     / x     / x          LIVER FUNCTIONS - ( 28 Oct 2018 03:57 )  Alb: 3.7 g/dL / Pro: 7.7 gm/dL / ALK PHOS: 80 U/L / ALT: 22 U/L / AST: 22 U/L / GGT: x                                      11.9   7.35  )-----------( 174      ( 28 Oct 2018 03:57 )             36.0                         12.8   5.99  )-----------( 192      ( 26 Oct 2018 12:18 )             38.4     28 Oct 2018 03:57    140    |  108    |  19     ----------------------------<  105    5.2     |  26     |  0.74   26 Oct 2018 12:18    140    |  106    |  15     ----------------------------<  90     4.2     |  26     |  0.65     Ca    9.2        28 Oct 2018 03:57  Ca    9.4        26 Oct 2018 12:18    TPro  7.7    /  Alb  3.7    /  TBili  0.3    /  DBili  x      /  AST  22     /  ALT  22     /  AlkPhos  80     28 Oct 2018 03:57      CARDIAC MARKERS ( 28 Oct 2018 08:53 )  0.184 ng/mL / x     / x     / x     / x      CARDIAC MARKERS ( 28 Oct 2018 03:57 )  0.221 ng/mL / x     / x     / x     / x        RADIOLOGY:  < from: Xray Chest 2 Views PA/Lat (10.28.18 @ 03:52) >  PROCEDURE DATE:  10/28/2018          INTERPRETATION:  Exam Date: 10/28/2018 3:52 AM    History: Chest pain    Technique: Frontal and lateral views of the chest with comparison to    2013    Findings:    The heart is normal in size.  The lungs are grossly clear. The apices and   hemidiaphragms are unremarkable. Degenerative changes of the visualized   osseous structures.    Impression:    No acute disease    No significant interval changeas compared to  2013    < end of copied text >    < from: NM Pulmonary Ventilation/Perfusion Scan (10.28.18 @ 14:42) >  IMPRESSION: Abnormal ventilation/perfusion lung scan. Very low   probability of pulmonary embolus.    < end of copied text >    EKG:  < from: 12 Lead ECG (10.28.18 @ 10:06) >  Diagnosis Line Normal sinus rhythm  Normal ECG    < end of copied text >    ECHO:  < from: TTE Echo Complete w/Doppler (12.30.15 @ 10:52) >   1. Normal global left ventricular systolic function.   2. Left ventricular ejection fraction, by visual estimation, is 65 to   70%.   3. Mild mitral valve regurgitation.   4. There is no evidenceof pericardial effusion.    < end of copied text >    CARDIAC CATHTERIZATION/STRESS TEST:  < from: Cardiac Cath Lab - Adult (10.26.18 @ 14:05) >  Angiographic Findings     Cardiac Arteries and Lesion Findings    LMCA: Diffuse irregularity.There is mild diffuse disease noted.    LAD: Diffuse irregularity.     Prox LAD: Distal subsection.0% stenosis 16 mm length.Pre procedure DOT   III flow was noted. The lesion was diagnosed as a low risk lesion.     The lesion was previouslytreated with the following devices: drug   eluting   stent.     Prox LAD: Proximal subsection.25% stenosis 8 mm length.Pre procedure DOT   III flow was noted. The lesion was diagnosed as a moderate risk lesion.    LCx: Diffuse irregularity.     Mid CX: Mid subsection.20% stenosis 30 mm length.Pre procedure DOT III   flow was noted. Good runoff was present.The lesion was diagnosed as a low   risk lesion.    RCA: Diffuse irregularity.     Mid RCA: 60% stenosis.    FFR  +------------------+-------------------------------+-----------------------  +  !FFR               !Stage/Medication               !Dosage                   !  +------------------+-------------------------------+-----------------------  +  !0.83              !Adenosine (I.V)            !140                      !  +------------------+-------------------------------+-----------------------  +    Valves  +------+----------------------------+----------------------------+---------  +  !Valve !Stenosis                    !Insufficiency                 !Comments !  +------+----------------------------+----------------------------+---------  +  !Aortic!No                          !Not assessed                !           !  +------+----------------------------+----------------------------+---------  +  !Mitral!Not assessed                !No insufficiency            !           !  +------+----------------------------+----------------------------+---------  +    LV function assessed as:Normal.  Ejection Fraction  +----------------------------------------------------------------------+---  +  !Method                                                                  !EF%!  +----------------------------------------------------------------------+---  +  !LV gram                                                      !60 !  +----------------------------------------------------------------------+---  +    VA  Ventriculography Findings:  Normal left ventricular systolic function.     Impression     Diagnostic Conclusions     2 Vessel CAD with NL LV FX. patent stent in LAD and moderate RCA disease   with negative FFR.    < end of copied text >      < from: Cardiac Cath Lab - Adult (10.29.18 @ 15:34) >  Angiographic Findings     Cardiac Arteries and Lesion Findings    RCA:     Mid RCA: Mid subsection.70% stenosis 14 mm length reduced to 0%.Pre   procedure DOT III flow was noted. Good runoff was present.The lesion was   diagnosed as a moderate risk lesion.The lesion was discrete.The lesion   showed mild angulation and mild tortuosity.     Post Procedure DOT III flow was present.     Comments:Prior to intervention on RCA, one angiogram of left coronary   done   which was unchanged from Friday.     IVUS showed mid portion of stent under deployed and had napkin ring   stenosis. It was then post dilated with as 3.0 mm NC balloon. Vessel   diameter (approximately 3.9 mm).     Devices used     * 6FR AR1 LAUNCHER     * .014 x 190cm Vesper XT     * Synergy JAIR Stent     Diameter: 2.5 mm. Length: 24 mm. Total duration: 23 sec.1 inflation(s).   to a max pressure of: 14 SRI.     * EAGLE EYE PLATINUM IVUS     * CHOICE PT 182CM     * 6FR HSI LAUNCHER     * CHOICE PT 182CM     * NC Euphora     Diameter: 3 mm. Length: 20 mm. Total duration: 35 sec.2 inflation(s). to   a max pressure of: 16 SRI.     Impression     Interventional Conclusions     Successful Coronary Intervention JAIR of RCA.     Recommendations     Manage with medical therapy.      < end of copied text >
PCP    Patient is a 68y old  Female who presents with a chief complaint of sob (29 Oct 2018 09:34)    HPI:  68F.  admitted 10/28/18.  family at bedside.  presented to ED c/o a cold sensation to the neck and jaw.  onset 30 minutes prior to taking new medication.  daughter gave sl NTG.  in ED, CE ordered.  TnI elevated.  recently admitted and discharged 10/25- from .  patient presented at that time with SOB and palpitations for 2 weeks.  LHC RCA 60% stenosis, non obstructive CAD.  medical management and lifestyle modifications were advised.  Ranexa 500mggpo bid was started.  patient was to follow up with Dr. Palla in 1-2 weeks.  ROS:  + chills.  PMHx  CAD-PCI to LAD ();  HTN;  HLD;  OP;  gout.  PSHx:  OS cataract extraction;  tooth disease-s/p upper and lower implants.  ALL:  aspirin (Other (Moderate); Swelling).    Rx:  Home Medications:  allopurinol 100 mg oral tablet: 1 tab(s) orally once a day (26 Oct 2018 11:15)  atorvastatin 20 mg oral tablet: 1 tab(s) orally once a day (26 Oct 2018 11:15)  bisoprolol: 2.5 milligram(s) orally once a day (26 Oct 2018 11:15)  Plavix 75 mg oral tablet: 1 tab(s) orally once a day (26 Oct 2018 11:15)  SocHx:  no tobacco.  no EtOH.  lives w/ family.  .  2 children.  country of origin, [place].   FHx:  mother + AMI;  sibling + AMI. (28 Oct 2018 08:20)    10/29/18- s/p card cath, stent to RCA    Review of system- All 10 systems reviewed and is as per HPI otherwise negative.     T(C): 36.9 (10-29-18 @ 11:10), Max: 36.9 (10-29-18 @ 11:10)  HR: 60 (10-29-18 @ 17:05) (60 - 82)  BP: 99/64 (10-29-18 @ 16:50) (99/64 - 126/73)  RR: 16 (10-29-18 @ 17:05) (16 - 18)  SpO2: 98% (10-29-18 @ 17:05) (97% - 100%)  Wt(kg): --    LABS:                        11.9   7.35  )-----------( 174      ( 28 Oct 2018 03:57 )             36.0     10-28    140  |  108  |  19  ----------------------------<  105<H>  5.2   |  26  |  0.74    Ca    9.2      28 Oct 2018 03:57    TPro  7.7  /  Alb  3.7  /  TBili  0.3  /  DBili  x   /  AST  22  /  ALT  22  /  AlkPhos  80  10-28    Urinalysis Basic - ( 28 Oct 2018 04:19 )  Color: Yellow / Appearance: Clear / S.005 / pH: x  Gluc: x / Ketone: Negative  / Bili: Negative / Urobili: Negative mg/dL   Blood: x / Protein: Negative mg/dL / Nitrite: Negative   Leuk Esterase: Moderate / RBC: x / WBC x   Sq Epi: x / Non Sq Epi: x / Bacteria: x    CARDIAC MARKERS ( 28 Oct 2018 08:53 )  0.184 ng/mL / x     / x     / x     / x      CARDIAC MARKERS ( 28 Oct 2018 03:57 )  0.221 ng/mL / x     / x     / x     / x        RADIOLOGY & ADDITIONAL TESTS:      PHYSICAL EXAM:  GENERAL: NAD, well-groomed, well-developed  HEAD:  Atraumatic, Normocephalic  EYES: EOMI, PERRLA, conjunctiva and sclera clear  HEENT: Moist mucous membranes  NECK: Supple, No JVD  NERVOUS SYSTEM:  Alert & Oriented X3, Motor Strength 5/5 B/L upper and lower extremities; DTRs 2+ intact and symmetric  CHEST/LUNG: Clear to auscultation bilaterally; No rales, rhonchi, wheezing, or rubs  HEART: Regular rate and rhythm; No murmurs, rubs, or gallops  ABDOMEN: Soft, Nontender, Nondistended; Bowel sounds present  GENITOURINARY- Voiding, no palpable bladder  EXTREMITIES:  2+ Peripheral Pulses, No clubbing, cyanosis, or edema  MUSCULOSKELTAL- No muscle tenderness, Muscle tone normal, No joint tenderness, no Joint swelling, Joint range of motion-normal  SKIN-no rash, no lesion  CNS- alert, oriented X3, non focal       allopurinol 100 milliGRAM(s) Oral daily  aspirin 325 milliGRAM(s) Oral once  aspirin  chewable 81 milliGRAM(s) Oral once  aspirin  chewable 162 milliGRAM(s) Oral once  Aspirin 81 mg in 81 mL water 0.1 mG/Dose 0.1 milliGRAM(s) Oral once  Aspirin 81 mg in 81 mL water 0.3 mG/Dose 0.3 milliGRAM(s) Oral once  Aspirin 81 mg in 81 mL water 1 mG/Dose 1 milliGRAM(s) Oral once  Aspirin 81 mg in 81 mL water 10 mG/Dose 10 milliGRAM(s) Oral once  Aspirin 81 mg in 81 mL water 20 mG/Dose 20 milliGRAM(s) Oral once  Aspirin 81 mg in 81 mL water 3 milliGRAM(s) 3 milliGRAM(s) Oral once  Aspirin 81 mg in 81 mL water 40 mG/Dose 40 milliGRAM(s) Oral once  atorvastatin 20 milliGRAM(s) Oral at bedtime  bisoprolol   Tablet 2.5 milliGRAM(s) Oral daily  clopidogrel Tablet 75 milliGRAM(s) Oral daily  diphenhydrAMINE 50 milliGRAM(s) Oral once  EPINEPHrine     1 mG/mL Injectable 0.3 milliGRAM(s) IntraMuscular once PRN  heparin  Injectable 5000 Unit(s) SubCutaneous every 8 hours  influenza   Vaccine 0.5 milliLiter(s) IntraMuscular once    Assessment/Plan  #ACS s/p stent to RCA  Cardio f/u appreciated  Patient placed on desensitization protocol for Aspirin  Cont Plavix, statin    #HTN/hyperlipidemia  Stable    #Dispo- monitor while on desensitization protocol. D/w pt and family at bedside
Subjective:    pat feeling better, VQ scan very low probability & neg duplex, unlikely thrombo-embilism, cardic stent today. No new complaint.    Home Medications:  allopurinol 100 mg oral tablet: 1 tab(s) orally once a day (26 Oct 2018 11:15)  atorvastatin 20 mg oral tablet: 1 tab(s) orally once a day (26 Oct 2018 11:15)  bisoprolol: 2.5 milligram(s) orally once a day (26 Oct 2018 11:15)  Plavix 75 mg oral tablet: 1 tab(s) orally once a day (26 Oct 2018 11:15)    MEDICATIONS  (STANDING):  allopurinol 100 milliGRAM(s) Oral daily  atorvastatin 20 milliGRAM(s) Oral at bedtime  bisoprolol   Tablet 2.5 milliGRAM(s) Oral daily  clopidogrel Tablet 75 milliGRAM(s) Oral daily  clopidogrel Tablet 225 milliGRAM(s) Oral once  heparin  Injectable 5000 Unit(s) SubCutaneous every 8 hours  influenza   Vaccine 0.5 milliLiter(s) IntraMuscular once    MEDICATIONS  (PRN):      Allergies    aspirin (Other (Moderate); Swelling)    Intolerances        Vital Signs Last 24 Hrs  T(C): 36.7 (29 Oct 2018 05:07), Max: 36.7 (29 Oct 2018 05:07)  T(F): 98 (29 Oct 2018 05:07), Max: 98 (29 Oct 2018 05:07)  HR: 70 (29 Oct 2018 05:07) (70 - 70)  BP: 105/72 (29 Oct 2018 05:07) (105/72 - 105/72)  BP(mean): --  RR: 17 (29 Oct 2018 05:07) (17 - 17)  SpO2: 99% (29 Oct 2018 05:07) (99% - 99%)      PHYSICAL EXAMINATION:    NECK:  Supple. No lymphadenopathy. Jugular venous pressure not elevated. Carotids equal.   HEART:   The cardiac impulse has a normal quality. Reg., Nl S1 and S2.  There are no murmurs, rubs or gallops noted  CHEST:  Chest is clear to auscultation. Normal respiratory effort.  ABDOMEN:  Soft and nontender.   EXTREMITIES:  There is no edema.       LABS:                        11.9   7.35  )-----------( 174      ( 28 Oct 2018 03:57 )             36.0     10-    140  |  108  |  19  ----------------------------<  105<H>  5.2   |  26  |  0.74    Ca    9.2      28 Oct 2018 03:57    TPro  7.7  /  Alb  3.7  /  TBili  0.3  /  DBili  x   /  AST  22  /  ALT  22  /  AlkPhos  80  10-      Urinalysis Basic - ( 28 Oct 2018 04:19 )    Color: Yellow / Appearance: Clear / S.005 / pH: x  Gluc: x / Ketone: Negative  / Bili: Negative / Urobili: Negative mg/dL   Blood: x / Protein: Negative mg/dL / Nitrite: Negative   Leuk Esterase: Moderate / RBC: x / WBC x   Sq Epi: x / Non Sq Epi: x / Bacteria: x    NM Pulmonary Ventilation/Perfusion Scan (10.28.18 @ 14:42) >    IMPRESSION: Abnormal ventilation/perfusion lung scan. Very low   probability of pulmonary embolus.    US Duplex Venous Lower Ext Complete, Bilateral (10.28.18 @ 13:02) >  IMPRESSION:   Unremarkable ultrasound of the right and left lower   extremity deep venous system.

## 2018-10-30 NOTE — PROGRESS NOTE ADULT - ASSESSMENT
HPI:  68F.   presented to ED c/o a cold sensation to the neck and jaw.  onset 30 minutes prior to taking new medication.  ALL:  aspirin (Other (Moderate); Swelling).      Patient now s/p angiogram  with PCI and JAIR to the RCA    - monitor in CCU  - AM labs and EKG reviewed   - procedure, outcome and f/u care reviewed with patient/ family   - continue ASA/ Plavix  - continue statin  - continue hospitalist service  - f/u with MD in 4-7 days of discharge HPI:  68F.   presented to ED c/o a cold sensation to the neck and jaw.  onset 30 minutes prior to taking new medication.  ALL:  aspirin (Other (Moderate); Swelling).      Patient now s/p angiogram  with PCI and JAIR to the RCA    - monitor in CCU  - AM labs and EKG reviewed   - procedure, outcome and f/u care reviewed with patient/ family   - continue  Plavix  - start ASA today  - continue statin  - continue hospitalist service  - f/u with MD in 4-7 days of discharge

## 2018-10-31 DIAGNOSIS — M81.0 AGE-RELATED OSTEOPOROSIS WITHOUT CURRENT PATHOLOGICAL FRACTURE: ICD-10-CM

## 2018-10-31 DIAGNOSIS — M10.9 GOUT, UNSPECIFIED: ICD-10-CM

## 2018-10-31 DIAGNOSIS — Z95.5 PRESENCE OF CORONARY ANGIOPLASTY IMPLANT AND GRAFT: ICD-10-CM

## 2018-10-31 DIAGNOSIS — Z79.02 LONG TERM (CURRENT) USE OF ANTITHROMBOTICS/ANTIPLATELETS: ICD-10-CM

## 2018-10-31 DIAGNOSIS — E78.00 PURE HYPERCHOLESTEROLEMIA, UNSPECIFIED: ICD-10-CM

## 2018-10-31 DIAGNOSIS — Z82.49 FAMILY HISTORY OF ISCHEMIC HEART DISEASE AND OTHER DISEASES OF THE CIRCULATORY SYSTEM: ICD-10-CM

## 2018-10-31 DIAGNOSIS — I25.119 ATHEROSCLEROTIC HEART DISEASE OF NATIVE CORONARY ARTERY WITH UNSPECIFIED ANGINA PECTORIS: ICD-10-CM

## 2018-10-31 DIAGNOSIS — I10 ESSENTIAL (PRIMARY) HYPERTENSION: ICD-10-CM

## 2018-10-31 DIAGNOSIS — Z98.42 CATARACT EXTRACTION STATUS, LEFT EYE: ICD-10-CM

## 2018-11-01 ENCOUNTER — OUTPATIENT (OUTPATIENT)
Dept: OUTPATIENT SERVICES | Facility: HOSPITAL | Age: 68
LOS: 1 days | End: 2018-11-01
Payer: MEDICARE

## 2018-11-01 PROCEDURE — G9001: CPT

## 2018-11-02 ENCOUNTER — APPOINTMENT (OUTPATIENT)
Dept: CARDIOLOGY | Facility: CLINIC | Age: 68
End: 2018-11-02
Payer: MEDICARE

## 2018-11-02 ENCOUNTER — NON-APPOINTMENT (OUTPATIENT)
Age: 68
End: 2018-11-02

## 2018-11-02 VITALS
WEIGHT: 156 LBS | DIASTOLIC BLOOD PRESSURE: 76 MMHG | BODY MASS INDEX: 25.99 KG/M2 | SYSTOLIC BLOOD PRESSURE: 118 MMHG | OXYGEN SATURATION: 99 % | HEART RATE: 74 BPM | HEIGHT: 65 IN

## 2018-11-02 DIAGNOSIS — R42 DIZZINESS AND GIDDINESS: ICD-10-CM

## 2018-11-02 PROCEDURE — 93000 ELECTROCARDIOGRAM COMPLETE: CPT

## 2018-11-02 PROCEDURE — 99214 OFFICE O/P EST MOD 30 MIN: CPT | Mod: 25

## 2018-11-04 DIAGNOSIS — I21.4 NON-ST ELEVATION (NSTEMI) MYOCARDIAL INFARCTION: ICD-10-CM

## 2018-11-04 DIAGNOSIS — M10.9 GOUT, UNSPECIFIED: ICD-10-CM

## 2018-11-04 DIAGNOSIS — E78.5 HYPERLIPIDEMIA, UNSPECIFIED: ICD-10-CM

## 2018-11-04 DIAGNOSIS — I24.9 ACUTE ISCHEMIC HEART DISEASE, UNSPECIFIED: ICD-10-CM

## 2018-11-04 DIAGNOSIS — I10 ESSENTIAL (PRIMARY) HYPERTENSION: ICD-10-CM

## 2018-11-04 DIAGNOSIS — I25.10 ATHEROSCLEROTIC HEART DISEASE OF NATIVE CORONARY ARTERY WITHOUT ANGINA PECTORIS: ICD-10-CM

## 2018-11-06 ENCOUNTER — EMERGENCY (EMERGENCY)
Facility: HOSPITAL | Age: 68
LOS: 0 days | Discharge: HOME | End: 2018-11-06
Attending: EMERGENCY MEDICINE | Admitting: EMERGENCY MEDICINE
Payer: MEDICARE

## 2018-11-06 VITALS
DIASTOLIC BLOOD PRESSURE: 78 MMHG | RESPIRATION RATE: 18 BRPM | SYSTOLIC BLOOD PRESSURE: 118 MMHG | HEART RATE: 93 BPM | OXYGEN SATURATION: 100 %

## 2018-11-06 VITALS — WEIGHT: 156.97 LBS | HEIGHT: 65 IN

## 2018-11-06 DIAGNOSIS — R07.9 CHEST PAIN, UNSPECIFIED: ICD-10-CM

## 2018-11-06 DIAGNOSIS — M10.9 GOUT, UNSPECIFIED: ICD-10-CM

## 2018-11-06 DIAGNOSIS — Z79.82 LONG TERM (CURRENT) USE OF ASPIRIN: ICD-10-CM

## 2018-11-06 DIAGNOSIS — R68.84 JAW PAIN: ICD-10-CM

## 2018-11-06 DIAGNOSIS — Z79.899 OTHER LONG TERM (CURRENT) DRUG THERAPY: ICD-10-CM

## 2018-11-06 DIAGNOSIS — E78.5 HYPERLIPIDEMIA, UNSPECIFIED: ICD-10-CM

## 2018-11-06 DIAGNOSIS — Z95.5 PRESENCE OF CORONARY ANGIOPLASTY IMPLANT AND GRAFT: ICD-10-CM

## 2018-11-06 DIAGNOSIS — Z79.02 LONG TERM (CURRENT) USE OF ANTITHROMBOTICS/ANTIPLATELETS: ICD-10-CM

## 2018-11-06 DIAGNOSIS — I25.10 ATHEROSCLEROTIC HEART DISEASE OF NATIVE CORONARY ARTERY WITHOUT ANGINA PECTORIS: ICD-10-CM

## 2018-11-06 DIAGNOSIS — M81.0 AGE-RELATED OSTEOPOROSIS WITHOUT CURRENT PATHOLOGICAL FRACTURE: ICD-10-CM

## 2018-11-06 DIAGNOSIS — I10 ESSENTIAL (PRIMARY) HYPERTENSION: ICD-10-CM

## 2018-11-06 LAB
ALBUMIN SERPL ELPH-MCNC: 3.7 G/DL — SIGNIFICANT CHANGE UP (ref 3.3–5)
ALP SERPL-CCNC: 86 U/L — SIGNIFICANT CHANGE UP (ref 40–120)
ALT FLD-CCNC: 23 U/L — SIGNIFICANT CHANGE UP (ref 12–78)
ANION GAP SERPL CALC-SCNC: 7 MMOL/L — SIGNIFICANT CHANGE UP (ref 5–17)
APTT BLD: 33 SEC — SIGNIFICANT CHANGE UP (ref 27.5–36.3)
AST SERPL-CCNC: 26 U/L — SIGNIFICANT CHANGE UP (ref 15–37)
BILIRUB SERPL-MCNC: 0.4 MG/DL — SIGNIFICANT CHANGE UP (ref 0.2–1.2)
BUN SERPL-MCNC: 16 MG/DL — SIGNIFICANT CHANGE UP (ref 7–23)
CALCIUM SERPL-MCNC: 9.5 MG/DL — SIGNIFICANT CHANGE UP (ref 8.5–10.1)
CHLORIDE SERPL-SCNC: 106 MMOL/L — SIGNIFICANT CHANGE UP (ref 96–108)
CK SERPL-CCNC: 101 U/L — SIGNIFICANT CHANGE UP (ref 26–192)
CO2 SERPL-SCNC: 25 MMOL/L — SIGNIFICANT CHANGE UP (ref 22–31)
CREAT SERPL-MCNC: 0.72 MG/DL — SIGNIFICANT CHANGE UP (ref 0.5–1.3)
D DIMER BLD IA.RAPID-MCNC: 166 NG/ML DDU — SIGNIFICANT CHANGE UP
GLUCOSE SERPL-MCNC: 103 MG/DL — HIGH (ref 70–99)
HCT VFR BLD CALC: 36.2 % — SIGNIFICANT CHANGE UP (ref 34.5–45)
HGB BLD-MCNC: 11.9 G/DL — SIGNIFICANT CHANGE UP (ref 11.5–15.5)
INR BLD: 1.03 RATIO — SIGNIFICANT CHANGE UP (ref 0.88–1.16)
MCHC RBC-ENTMCNC: 29.5 PG — SIGNIFICANT CHANGE UP (ref 27–34)
MCHC RBC-ENTMCNC: 32.9 GM/DL — SIGNIFICANT CHANGE UP (ref 32–36)
MCV RBC AUTO: 89.8 FL — SIGNIFICANT CHANGE UP (ref 80–100)
NRBC # BLD: 0 /100 WBCS — SIGNIFICANT CHANGE UP (ref 0–0)
NT-PROBNP SERPL-SCNC: 24 PG/ML — SIGNIFICANT CHANGE UP (ref 0–125)
PLATELET # BLD AUTO: 196 K/UL — SIGNIFICANT CHANGE UP (ref 150–400)
POTASSIUM SERPL-MCNC: 5.2 MMOL/L — SIGNIFICANT CHANGE UP (ref 3.5–5.3)
POTASSIUM SERPL-SCNC: 5.2 MMOL/L — SIGNIFICANT CHANGE UP (ref 3.5–5.3)
PROT SERPL-MCNC: 8.5 GM/DL — HIGH (ref 6–8.3)
PROTHROM AB SERPL-ACNC: 11.4 SEC — SIGNIFICANT CHANGE UP (ref 10–12.9)
RBC # BLD: 4.03 M/UL — SIGNIFICANT CHANGE UP (ref 3.8–5.2)
RBC # FLD: 11.9 % — SIGNIFICANT CHANGE UP (ref 10.3–14.5)
SODIUM SERPL-SCNC: 138 MMOL/L — SIGNIFICANT CHANGE UP (ref 135–145)
TROPONIN I SERPL-MCNC: <0.015 NG/ML — SIGNIFICANT CHANGE UP (ref 0.01–0.04)
TROPONIN I SERPL-MCNC: <0.015 NG/ML — SIGNIFICANT CHANGE UP (ref 0.01–0.04)
WBC # BLD: 7.4 K/UL — SIGNIFICANT CHANGE UP (ref 3.8–10.5)
WBC # FLD AUTO: 7.4 K/UL — SIGNIFICANT CHANGE UP (ref 3.8–10.5)

## 2018-11-06 PROCEDURE — 93010 ELECTROCARDIOGRAM REPORT: CPT

## 2018-11-06 PROCEDURE — 71045 X-RAY EXAM CHEST 1 VIEW: CPT | Mod: 26

## 2018-11-06 PROCEDURE — 99284 EMERGENCY DEPT VISIT MOD MDM: CPT | Mod: 25

## 2018-11-06 NOTE — ED ADULT NURSE REASSESSMENT NOTE - NS ED NURSE REASSESS COMMENT FT1
2nd CE drawn from right hand using butterfly. pt tolerated well. Blood obtained and sent to lab. Will cont to monitor for safety and comfort.

## 2018-11-06 NOTE — ED ADULT NURSE NOTE - NSIMPLEMENTINTERV_GEN_ALL_ED
Implemented All Universal Safety Interventions:  [place] to call system. Call bell, personal items and telephone within reach. Instruct patient to call for assistance. Room bathroom lighting operational. Non-slip footwear when patient is off stretcher. Physically safe environment: no spills, clutter or unnecessary equipment. Stretcher in lowest position, wheels locked, appropriate side rails in place.

## 2018-11-06 NOTE — ED PROVIDER NOTE - MEDICAL DECISION MAKING DETAILS
pt with tremors and jaw pain, questionalbe anginal equivalent will get 2 sets of cardiac enzymes if jaw pain persists will admit

## 2018-11-06 NOTE — ED PROVIDER NOTE - PROGRESS NOTE DETAILS
pt without tremors or jaw pain, 2 negative cardiac enzymes pt states she feels normal now. pt offered admission, pt and family decline admission saying they will follow up with Dr. Palla today will adam/sherly Emery DO

## 2018-11-06 NOTE — ED ADULT TRIAGE NOTE - CHIEF COMPLAINT QUOTE
sudden onset shivering and chest discomfort started 10 minutes prior to arrival. cardiac stent placed on monday by dr. wilkins

## 2018-11-06 NOTE — ED ADULT NURSE NOTE - CINV DISCH EXIT CARE INSTR PROVIDE
Pt put on call light to notify RN that he is feeling lightheaded.writer at bedside,vitals obtained and Dr. Yuen notified. Pt laid flat and IV fluids started.   
Pt reports feeling better. IVF infusion complete. Pt able to sit, stand, and walk without difficulty. Denies CP, SOB, dizziness, lightheaded, or nausea. Pt ambulatory with steady gait in department. Pt remained in NSR with ambulation trial. Provider updated and at bedside to discuss disposition plan with patient and family.   
Pt used a urinal and converted back into Normal Sinus Rhythm. Dr. Yuen aware and repeat EKG done.  
Report given to Jeniffer DOMINIQUE  
yes

## 2018-11-06 NOTE — ED PROVIDER NOTE - OBJECTIVE STATEMENT
69 yo female with ho cad and stented last week pw tremors and jaw pain, daughter states that when pt had the stent she was shaking like tonight.

## 2018-11-06 NOTE — ED ADULT NURSE NOTE - OBJECTIVE STATEMENT
Patient complaining of chills. Patient had stent placed 1 week ago by MD Gutiérrez. Patient denies chest pain.

## 2018-11-06 NOTE — ED PROVIDER NOTE - CONSTITUTIONAL, MLM
normal... Well appearing, well nourished, awake, alert, oriented to person, place, time/situation and in no apparent distress. pt shaking entire body

## 2018-11-08 ENCOUNTER — NON-APPOINTMENT (OUTPATIENT)
Age: 68
End: 2018-11-08

## 2018-11-08 ENCOUNTER — APPOINTMENT (OUTPATIENT)
Dept: CARDIOLOGY | Facility: CLINIC | Age: 68
End: 2018-11-08
Payer: MEDICARE

## 2018-11-08 VITALS — BODY MASS INDEX: 26.16 KG/M2 | HEIGHT: 65 IN | WEIGHT: 157 LBS

## 2018-11-08 VITALS — DIASTOLIC BLOOD PRESSURE: 67 MMHG | SYSTOLIC BLOOD PRESSURE: 110 MMHG | HEART RATE: 60 BPM | OXYGEN SATURATION: 95 %

## 2018-11-08 PROCEDURE — 93000 ELECTROCARDIOGRAM COMPLETE: CPT

## 2018-11-08 PROCEDURE — 99214 OFFICE O/P EST MOD 30 MIN: CPT | Mod: 25

## 2018-11-08 PROCEDURE — 93306 TTE W/DOPPLER COMPLETE: CPT

## 2018-11-08 RX ORDER — MULTIVIT-MIN/FOLIC/VIT K/LYCOP 400-300MCG
25 MCG TABLET ORAL DAILY
Qty: 90 | Refills: 3 | Status: ACTIVE | COMMUNITY

## 2018-11-08 RX ORDER — FLUTICASONE PROPIONATE 50 UG/1
50 SPRAY, METERED NASAL
Qty: 16 | Refills: 0 | Status: ACTIVE | COMMUNITY
Start: 2018-07-10

## 2018-11-08 RX ORDER — OMEGA-3 FATTY ACIDS/FISH OIL 360-1200MG
1200 CAPSULE ORAL DAILY
Refills: 0 | Status: ACTIVE | COMMUNITY

## 2018-11-08 NOTE — REVIEW OF SYSTEMS
[Shortness Of Breath] : no shortness of breath [Palpitations] : palpitations [Negative] : Heme/Lymph

## 2018-11-08 NOTE — HISTORY OF PRESENT ILLNESS
[FreeTextEntry1] : 68 year old female with  hx of CAD LAD PCI , Hypertension , hyperlipidemia , who came for follow up after hospitalization , Patient had OP cardiac cath , who had  moderate RCA lesion , however over weekend she developed chest pain , readmitted to hospital  had RCA stent on 10/29/18 . Patient again started feeling some discomfort , shaky feeling   went to ER   was checked out , her blood work was normal   ,again she had  some discomfort yesterday , not associated with sob or palpitation  \par   \par Patient denies any exertional chest pain or shortness of breath on routine exertion ,  Patient think  too much \par \par \par patient did have CT angio few weeks ago ,which was negative for PE \par \par \par \par \par \par \par \par

## 2018-11-08 NOTE — PHYSICAL EXAM
[General Appearance - Well Developed] : well developed [Normal Appearance] : normal appearance [General Appearance - Well Nourished] : well nourished [Normal Conjunctiva] : the conjunctiva exhibited no abnormalities [Eyelids - No Xanthelasma] : the eyelids demonstrated no xanthelasmas [Normal Oral Mucosa] : normal oral mucosa [Normal Oropharynx] : normal oropharynx [Normal Jugular Venous V Waves Present] : normal jugular venous V waves present [Respiration, Rhythm And Depth] : normal respiratory rhythm and effort [Exaggerated Use Of Accessory Muscles For Inspiration] : no accessory muscle use [Heart Rate And Rhythm] : heart rate and rhythm were normal [Heart Sounds] : normal S1 and S2 [Murmurs] : no murmurs present [Arterial Pulses Normal] : the arterial pulses were normal [Edema] : no peripheral edema present [Bowel Sounds] : normal bowel sounds [Abdomen Soft] : soft [Abdomen Tenderness] : non-tender [] : no hepato-splenomegaly [Abnormal Walk] : normal gait [Petechial Hemorrhages (___cm)] : no petechial hemorrhages [Nail Splinter Hemorrhages] : no splinter hemorrhages of the nails [Skin Color & Pigmentation] : normal skin color and pigmentation [Skin Turgor] : normal skin turgor [No Venous Stasis] : no venous stasis [Skin Lesions] : no skin lesions [Oriented To Time, Place, And Person] : oriented to person, place, and time [Impaired Insight] : insight and judgment were intact [Affect] : the affect was normal

## 2018-11-08 NOTE — ASSESSMENT
[FreeTextEntry1] : Patient with Above hx with non specific atypical chest discomfort , unclear   \par \par CAD RCA   PCI     continue asa plavix , statin \par \par \par atypical chest pain , unclear etiology ,  negative troponin , negative UA , recent negative CT angio for PE , today echo showed no significant change from prior , continue protonix \par \par HTN controlled , continue medication \par \par HLD continue medication

## 2018-11-08 NOTE — REASON FOR VISIT
[Follow-Up - From Hospitalization] : follow-up of a recent hospitalization for [Chest Pain] : chest pain [Coronary Artery Disease] : coronary artery disease [Hyperlipidemia] : hyperlipidemia [Hypertension] : hypertension [Medication Management] : Medication management [FreeTextEntry1] : CAD

## 2018-11-09 DIAGNOSIS — Z71.89 OTHER SPECIFIED COUNSELING: ICD-10-CM

## 2018-11-16 ENCOUNTER — NON-APPOINTMENT (OUTPATIENT)
Age: 68
End: 2018-11-16

## 2018-11-16 ENCOUNTER — APPOINTMENT (OUTPATIENT)
Dept: CARDIOLOGY | Facility: CLINIC | Age: 68
End: 2018-11-16
Payer: MEDICARE

## 2018-11-16 VITALS — OXYGEN SATURATION: 97 % | DIASTOLIC BLOOD PRESSURE: 64 MMHG | HEART RATE: 63 BPM | SYSTOLIC BLOOD PRESSURE: 104 MMHG

## 2018-11-16 VITALS — BODY MASS INDEX: 25.99 KG/M2 | WEIGHT: 156 LBS | HEIGHT: 65 IN

## 2018-11-16 PROCEDURE — 93000 ELECTROCARDIOGRAM COMPLETE: CPT

## 2018-11-16 PROCEDURE — 99214 OFFICE O/P EST MOD 30 MIN: CPT | Mod: 25

## 2018-11-16 NOTE — DISCUSSION/SUMMARY
[Unlikely Cardiac Ischemia (Low Prob.)] : chest pain unlikely to represent cardiac ischemia (low probability) [Stable] : stable [None] : none [Patient] : the patient

## 2018-11-16 NOTE — ASSESSMENT
[FreeTextEntry1] : Patient with Above hx with non specific atypical chest discomfort , unclear   \par \par CAD RCA   PCI     continue asa plavix , statin \par \par \par atypical chest pain ,improved on protonix , \par  negative troponin , negative UA , recent negative CT angio for PE , today echo showed no significant change from prior , continue protonix \par \par HTN controlled , continue medication \par \par HLD continue medication\par \par sinking feeling probably due to  PACS on holter ,   encourage  activity  recommend telemetry to correlated symptoms with underlying arrhythmia

## 2018-11-16 NOTE — REVIEW OF SYSTEMS
[Earache] : no earache [Shortness Of Breath] : no shortness of breath [Chest Pain] : no chest pain [Palpitations] : palpitations [Negative] : Heme/Lymph

## 2018-11-16 NOTE — REASON FOR VISIT
[Follow-Up - Clinic] : a clinic follow-up of [Coronary Artery Disease] : coronary artery disease [Hyperlipidemia] : hyperlipidemia [Hypertension] : hypertension [Medication Management] : Medication management [Palpitations] : palpitations [FreeTextEntry1] : CAD

## 2018-11-16 NOTE — HISTORY OF PRESENT ILLNESS
[FreeTextEntry1] : 68 year old female with  hx of CAD LAD PCI , Hypertension , hyperlipidemia , who came for follow up ,says she is feeling better on protonix , occasional  sinking  feeling occasional in chest  after skipped heart beat isolated, no dizziness , \par   \par Patient denies any exertional chest pain or shortness of breath on routine exertion ,  Patient think  too much \par \par \par patient did have CT angio few weeks ago ,which was negative for PE \par \par \par \par \par \par \par \par

## 2018-12-04 NOTE — ASU PATIENT PROFILE, ADULT - LANGUAGE ASSISTANCE NEEDED
Progress Notes by Joe Khalil MD at 09/27/18 10:27 AM     Author:  Joe Khalil MD Service:  (none) Author Type:  Physician     Filed:  09/27/18 10:37 AM Encounter Date:  9/27/2018 Status:  Signed     :  Joe Khalil MD (Physician)              SUBJECTIVE:  Cj Snyder is a 86 year old male who presents today for[SS1.1T] follow up of[SS1.2T] anemia[SS1.2M] doing well.  Denies fevers, chills or nightsweats. No excessive Fatigue . Appetite is good and not losing weight. Denies associated cough, dyspnea, nausea vomiting, abdominal pain , diarrhea or constipation. Denies severe backache or severe pain at any other site.[SS1.2T]       HPI[SS1.1M]    His past medical history is significant for:  Patient Active Problem List    Diagnosis    • ASHD NATIVE CORONARY ARTERY   • Hyperlipidemia   • HTN (hypertension)   • Postsurgical aortocoronary bypass status   • Impaired fasting glucose   • Hypothyroid   • Hemolytic anemia   • Memory loss   • Shoulder impingement syndrome   • Bilateral carotid artery disease       Allergies: Dipyridamole and Morphine and related    Current Outpatient Prescriptions     Medication  Sig   • donepezil (ARICEPT) 10 MG tablet Take 1 Tab by mouth nightly.   • lisinopril (PRINIVIL,ZESTRIL) 2.5 MG tablet Take 1 Tab by mouth daily.   • metoprolol (LOPRESSOR) 25 MG tablet Take 0.5 Tabs by mouth 2 (two) times daily.   • levothyroxine 100 MCG tablet Take 1 Tab by mouth daily.   • nitroGLYCERIN (NITROSTAT) 0.4 MG SL tablet Place 1 Tab under the tongue every 5 (five) minutes as needed for Chest pain. as directed   • clopidogrel (PLAVIX) 75 MG tablet Take 1 Tab by mouth daily.   • Ferrous Sulfate Dried (FERROUS SULFATE CR OR) Take  by mouth.   • Cholecalciferol (VITAMIN D) 400 UNITS Cap Take 400 Units by mouth daily.   • aspirin 81 MG tablet Take 81 mg by mouth every other day.   • Simvastatin 40 MG OR TABS 1 TABLET AT BEDTIME[SS1.1T]         ROS[SS1.1M]    OBJECTIVE:  /62   Pulse 62  Temp 97.4 °F (36.3 °C) (Tympanic)  Resp 18  Ht 5' 7\" (1.702 m)  Wt 200 lb 4.6 oz (90.9 kg)  SpO2 97%  BMI 31.37 kg/m2[SS1.1T]     Physical Exam   Neck:[SS1.2T] No JVD[SS1.2M] present.[SS1.2T] No tracheal deviation[SS1.2M] present.   Cardiovascular:[SS1.2T] Normal rate[SS1.2M],[SS1.2T] normal heart sounds[SS1.2M] and[SS1.2T] intact distal pulses[SS1.2M].    Pulmonary/Chest: No[SS1.2T] respiratory distress[SS1.2M]. He has[SS1.2T] no wheezes[SS1.2M]. He has[SS1.2T] no rales[SS1.2M]. He exhibits[SS1.2T] no tenderness[SS1.2M].   Abdominal: He exhibits[SS1.2T] no distension[SS1.2M] and[SS1.2T] no mass[SS1.2M]. There is[SS1.2T] no tenderness[SS1.2M]. There is[SS1.2T] no rebound[SS1.2M] and[SS1.2T] no guarding[SS1.2M].   Musculoskeletal: He exhibits no[SS1.2T] edema[SS1.2M] or[SS1.2T] tenderness[SS1.2M].   Lymphadenopathy:     He has[SS1.2T] no cervical adenopathy[SS1.2M].[SS1.2T]           Lab Results      Component  Value Date/Time    HGB 13.2 (L) 04/30/2018 07:10 AM    HGB 12.8 (L) 05/14/2015 06:02 AM    MCV 95.3 (H) 04/30/2018 07:10 AM    WBC 6.4 04/30/2018 07:10 AM    ANC 4.2 04/30/2018 07:10 AM    ALC 1.5 04/30/2018 07:10 AM     04/30/2018 07:10 AM    CA 9.1 04/30/2018 07:10 AM    CA 9.4 05/14/2015 06:02 AM    MG 1.9 07/01/2015 12:49 PM    CREAT 0.9 04/30/2018 07:10 AM    BILI 0.7 07/20/2017 10:18 AM    ALB 3.7 07/20/2017 10:18 AM    AST 28 07/20/2017 10:18 AM    ALT 29 07/20/2017 10:18 AM    IRON 75 02/27/2018 01:14 PM    TIBC 282 02/27/2018 01:14 PM    FERRITIN 211 02/27/2018 01:14 PM    VITB12 361 02/27/2018 01:14 PM    FOLATE 18 (H) 02/27/2018 01:14 PM     02/27/2018 01:14 PM    TSH 3.93 04/30/2018 07:10 AM    HAPTOGLOB 181 02/27/2018 01:14 PM    HAPTOGLOB 168 07/21/2015 07:41 AM      Lab Results      Component  Value Date/Time    HGB 13.2 (L) 04/30/2018 07:10 AM    HGB 12.3 (L) 02/27/2018 01:14 PM    HGB 12.4 (L) 01/30/2018 09:14 AM    HGB 12.8 (L) 05/14/2015 06:02 AM    HGB 12.3  (L) 05/13/2015 05:51 AM    WBC 6.4 04/30/2018 07:10 AM    WBC 6.8 02/27/2018 01:14 PM    WBC 6.4 01/30/2018 09:14 AM    MCV 95.3 (H) 04/30/2018 07:10 AM    MCV 89.8 02/27/2018 01:14 PM    MCV 94.1 01/30/2018 09:14 AM    ANC 4.2 04/30/2018 07:10 AM    ANC 4.2 02/27/2018 01:14 PM    ANC 4.0 01/30/2018 09:14 AM    ALC 1.5 04/30/2018 07:10 AM    ALC 2.0 02/27/2018 01:14 PM    ALC 1.8 01/30/2018 09:14 AM     04/30/2018 07:10 AM     02/27/2018 01:14 PM     01/30/2018 09:14 AM[SS1.1T]     8/10/2018 Hb = 13.2[SS1.2M]    ASSESSMENT:[SS1.1T]  Hemolytic anemia, Abraham positive, Dx Jan 2014:  Feels well. Hb stable. Will recheck hemolysis labs.   Haptoglobin is normal and LDH is normal.  Abraham is still positive as of Jul 2016.      Was started prednisone 60 mg po daily on 1/28/14. Was slowly tapered off in Aug 2014.  Completely off steroids since Aug 2014. On protonix for gastric ulcers.     Anemia developed in Nov 2013. EGD showed gastric ulcers in Dec 2013. He is on ASA and plavix and has cardiac stents. CT chest in Apr 2015 showed mild adenopathy improved from Dec 2014.     He is on aricept for memory loss since Jan 2015.[SS1.1C]      PLAN:  See after visit summary for follow up instructions.[SS1.1T]          Revision History        User Key Date/Time User Provider Type Action    > SS1.2 09/27/18 10:37 AM Joe Khalil MD Physician Sign     SS1.1 09/27/18 10:27 AM Joe Khalil MD Physician     C - Copied, M - Manual, T - Template             Yes-Patient/Caregiver accepts free interpretation services...

## 2019-01-28 ENCOUNTER — MEDICATION RENEWAL (OUTPATIENT)
Age: 69
End: 2019-01-28

## 2019-03-14 ENCOUNTER — NON-APPOINTMENT (OUTPATIENT)
Age: 69
End: 2019-03-14

## 2019-03-14 ENCOUNTER — APPOINTMENT (OUTPATIENT)
Dept: CARDIOLOGY | Facility: CLINIC | Age: 69
End: 2019-03-14
Payer: MEDICARE

## 2019-03-14 VITALS
OXYGEN SATURATION: 99 % | HEART RATE: 63 BPM | BODY MASS INDEX: 25.16 KG/M2 | DIASTOLIC BLOOD PRESSURE: 55 MMHG | HEIGHT: 65 IN | WEIGHT: 151 LBS | SYSTOLIC BLOOD PRESSURE: 89 MMHG

## 2019-03-14 VITALS — SYSTOLIC BLOOD PRESSURE: 110 MMHG | DIASTOLIC BLOOD PRESSURE: 60 MMHG

## 2019-03-14 PROCEDURE — 93000 ELECTROCARDIOGRAM COMPLETE: CPT

## 2019-03-14 PROCEDURE — 99214 OFFICE O/P EST MOD 30 MIN: CPT | Mod: 25

## 2019-03-14 NOTE — HISTORY OF PRESENT ILLNESS
[FreeTextEntry1] : 68 year old female with  hx of CAD LAD PCI , Hypertension , hyperlipidemia , who came for follow up ,says she is feeling better on Protonix , came for follow up  \par   \par Patient denies any exertional chest pain or shortness of breath on routine exertion ,  Patient think  too much \par \par \par patient did have CT angio few weeks ago ,which was negative for PE \par \par \par \par \par \par \par \par

## 2019-03-14 NOTE — REVIEW OF SYSTEMS
[Blurry Vision] : no blurred vision [Earache] : no earache [Shortness Of Breath] : no shortness of breath [Dyspnea on exertion] : not dyspnea during exertion [Chest Pain] : no chest pain [Palpitations] : no palpitations [Negative] : Heme/Lymph

## 2019-03-14 NOTE — ASSESSMENT
[FreeTextEntry1] : Patient with Above hx with non specific atypical chest discomfort , unclear   \par \par CAD RCA   PCI     continue asa plavix , statin \par \par atypical chest pain ,improved on protonix ,  resolved \par  \par negative troponin , negative UA , recent negative CT angio for PE , today echo showed no significant change from prior , continue protonix \par \par HTN controlled , continue medication \par \par HLD continue medication\par \par sinking feeling probably due to  PACS on holter ,   encourage  activity  recommend telemetry to correlated symptoms with underlying arrhythmia  no signifcant arrhythmia , improved symptoms

## 2019-03-16 ENCOUNTER — APPOINTMENT (OUTPATIENT)
Dept: MAMMOGRAPHY | Facility: CLINIC | Age: 69
End: 2019-03-16
Payer: MEDICARE

## 2019-03-16 ENCOUNTER — OUTPATIENT (OUTPATIENT)
Dept: OUTPATIENT SERVICES | Facility: HOSPITAL | Age: 69
LOS: 1 days | End: 2019-03-16
Payer: MEDICARE

## 2019-03-16 DIAGNOSIS — Z00.8 ENCOUNTER FOR OTHER GENERAL EXAMINATION: ICD-10-CM

## 2019-03-16 PROCEDURE — 77063 BREAST TOMOSYNTHESIS BI: CPT

## 2019-03-16 PROCEDURE — 77067 SCR MAMMO BI INCL CAD: CPT

## 2019-03-16 PROCEDURE — 77063 BREAST TOMOSYNTHESIS BI: CPT | Mod: 26

## 2019-03-16 PROCEDURE — 77067 SCR MAMMO BI INCL CAD: CPT | Mod: 26

## 2019-05-15 ENCOUNTER — APPOINTMENT (OUTPATIENT)
Dept: DERMATOLOGY | Facility: CLINIC | Age: 69
End: 2019-05-15
Payer: MEDICARE

## 2019-05-15 DIAGNOSIS — L21.9 SEBORRHEIC DERMATITIS, UNSPECIFIED: ICD-10-CM

## 2019-05-15 PROCEDURE — 99213 OFFICE O/P EST LOW 20 MIN: CPT

## 2019-05-15 RX ORDER — ASPIRIN 81 MG
81 TABLET, DELAYED RELEASE (ENTERIC COATED) ORAL DAILY
Refills: 2 | Status: DISCONTINUED | COMMUNITY
End: 2019-05-15

## 2019-05-15 NOTE — HISTORY OF PRESENT ILLNESS
[de-identified] : Pt. for check of scalp;  has itching, uses cormax soln on prn basis with good results\par Hx Patch test proven PPD allergy

## 2019-08-23 ENCOUNTER — NON-APPOINTMENT (OUTPATIENT)
Age: 69
End: 2019-08-23

## 2019-08-23 ENCOUNTER — APPOINTMENT (OUTPATIENT)
Dept: CARDIOLOGY | Facility: CLINIC | Age: 69
End: 2019-08-23
Payer: MEDICARE

## 2019-08-23 VITALS — HEART RATE: 54 BPM | DIASTOLIC BLOOD PRESSURE: 58 MMHG | OXYGEN SATURATION: 99 % | SYSTOLIC BLOOD PRESSURE: 96 MMHG

## 2019-08-23 VITALS — SYSTOLIC BLOOD PRESSURE: 120 MMHG | DIASTOLIC BLOOD PRESSURE: 70 MMHG

## 2019-08-23 VITALS — HEIGHT: 65 IN | BODY MASS INDEX: 24.99 KG/M2 | WEIGHT: 150 LBS

## 2019-08-23 PROCEDURE — 93000 ELECTROCARDIOGRAM COMPLETE: CPT

## 2019-08-23 PROCEDURE — 99214 OFFICE O/P EST MOD 30 MIN: CPT | Mod: 25

## 2019-08-23 NOTE — PHYSICAL EXAM
[General Appearance - Well Developed] : well developed [Normal Appearance] : normal appearance [General Appearance - Well Nourished] : well nourished [Normal Conjunctiva] : the conjunctiva exhibited no abnormalities [Eyelids - No Xanthelasma] : the eyelids demonstrated no xanthelasmas [Normal Oral Mucosa] : normal oral mucosa [Normal Oropharynx] : normal oropharynx [Normal Jugular Venous V Waves Present] : normal jugular venous V waves present [Respiration, Rhythm And Depth] : normal respiratory rhythm and effort [Exaggerated Use Of Accessory Muscles For Inspiration] : no accessory muscle use [Heart Rate And Rhythm] : heart rate and rhythm were normal [Heart Sounds] : normal S1 and S2 [Murmurs] : no murmurs present [Arterial Pulses Normal] : the arterial pulses were normal [Edema] : no peripheral edema present [Bowel Sounds] : normal bowel sounds [Abdomen Soft] : soft [Abdomen Tenderness] : non-tender [] : no hepato-splenomegaly [Abnormal Walk] : normal gait [Nail Splinter Hemorrhages] : no splinter hemorrhages of the nails [Petechial Hemorrhages (___cm)] : no petechial hemorrhages [Skin Color & Pigmentation] : normal skin color and pigmentation [Skin Turgor] : normal skin turgor [No Venous Stasis] : no venous stasis [Skin Lesions] : no skin lesions [Oriented To Time, Place, And Person] : oriented to person, place, and time [Impaired Insight] : insight and judgment were intact [Affect] : the affect was normal

## 2019-08-23 NOTE — ASSESSMENT
[FreeTextEntry1] : Patient with Above hx with non specific atypical chest discomfort , unclear   \par \par CAD RCA   PCI     continue asa plavix , statin \par \par atypical chest pain ,improved on protonix ,  resolved \par  \par HTN controlled , continue medication \par \par HLD continue medication\par \par sinking feeling probably due to  PACS on holter ,  resolved ,  encourage  activity  recommend telemetry to correlated symptoms with underlying arrhythmia  no significant arrhythmia , improved symptoms \par \par patient is cardiac wise stable , sinus bradycardia due to BB

## 2019-08-23 NOTE — HISTORY OF PRESENT ILLNESS
[FreeTextEntry1] : 69 year old female with  hx of CAD LAD PCI , Hypertension , hyperlipidemia , who came for follow up ,says she is feeling better on Protonix , came for follow up  doing ok other than \par   \par Patient denies any exertional chest pain or shortness of breath on routine exertion ,  Patient think  too much \par \par patient blood pressure is controlled, \par \par \par \par \par \par \par \par \par \par

## 2019-08-26 ENCOUNTER — APPOINTMENT (OUTPATIENT)
Dept: RADIOLOGY | Facility: CLINIC | Age: 69
End: 2019-08-26
Payer: MEDICARE

## 2019-08-26 ENCOUNTER — OUTPATIENT (OUTPATIENT)
Dept: OUTPATIENT SERVICES | Facility: HOSPITAL | Age: 69
LOS: 1 days | End: 2019-08-26
Payer: MEDICARE

## 2019-08-26 DIAGNOSIS — Z00.8 ENCOUNTER FOR OTHER GENERAL EXAMINATION: ICD-10-CM

## 2019-08-26 PROCEDURE — 73564 X-RAY EXAM KNEE 4 OR MORE: CPT | Mod: 26,LT

## 2019-08-26 PROCEDURE — 73610 X-RAY EXAM OF ANKLE: CPT

## 2019-08-26 PROCEDURE — 73610 X-RAY EXAM OF ANKLE: CPT | Mod: 26,LT

## 2019-08-26 PROCEDURE — 73564 X-RAY EXAM KNEE 4 OR MORE: CPT

## 2019-09-27 ENCOUNTER — APPOINTMENT (OUTPATIENT)
Dept: ORTHOPEDIC SURGERY | Facility: CLINIC | Age: 69
End: 2019-09-27
Payer: MEDICARE

## 2019-09-27 DIAGNOSIS — M25.572 PAIN IN LEFT ANKLE AND JOINTS OF LEFT FOOT: ICD-10-CM

## 2019-09-27 PROCEDURE — 99204 OFFICE O/P NEW MOD 45 MIN: CPT

## 2019-09-27 NOTE — ADDENDUM
[FreeTextEntry1] : I, Aly Shaniqua, acted solely as a scribe for Dr. Tae Montaño on this date 09/27/2019 .\par All medical record entries made by the Scribe were at my, Dr. Tae Montaño, direction and personally dictated by me on 09/27/2019 . I have reviewed the chart and agree that the record accurately reflects my personal performance of the history, physical exam, assessment and plan. I have also personally directed, reviewed, and agreed with the chart.\par \par \par

## 2019-09-27 NOTE — CONSULT LETTER
[Dear  ___] : Dear  [unfilled], [Consult Letter:] : I had the pleasure of evaluating your patient, [unfilled]. [Please see my note below.] : Please see my note below. [Consult Closing:] : Thank you very much for allowing me to participate in the care of this patient.  If you have any questions, please do not hesitate to contact me. [Sincerely,] : Sincerely, [FreeTextEntry3] : Tae Montaño, DO\par Foot and Ankle Surgery\par

## 2019-09-27 NOTE — DISCUSSION/SUMMARY
[de-identified] : Today I had a lengthy discussion with the patient regarding their left foot/ankle pain.I have addressed all the patient's concerns surrounding the pathology of their condition. I recommend the patient undergo a course of physical therapy for the left ankle 2-3 times a week for a total of 6-8 weeks. A prescription was given for the physical therapy today. I recommended the patient utilize an ASO brace. The patient was provided with the ASO brace in the office today. I would like to see the patient back in the office PRN to reassess their condition. The patient understood and verbally agreed to the treatment plan. All of their questions were answered and they were satisfied with the visit. The patient should call the office if they have any questions or experience worsening symptoms.\par \par \par

## 2019-09-27 NOTE — HISTORY OF PRESENT ILLNESS
[FreeTextEntry1] : 69 year old female presenting with left foot/ankle pain. The patient’s pain is noted to be a 5/10. The patient's pain began on 8/18/19 when she tripped over kids toys and fell. The patient describes their pain as achy, cramping, and throbbing. She also notes swelling. The pain is made worse when bending, sitting, and going up stairs. The patient has not been attending physical therapy for this issue. She is currently using Voltaren gel. The patient is accompanied by their daughter. No other complaints at this time.\par \par \par

## 2019-11-26 ENCOUNTER — MEDICATION RENEWAL (OUTPATIENT)
Age: 69
End: 2019-11-26

## 2019-11-26 ENCOUNTER — RX RENEWAL (OUTPATIENT)
Age: 69
End: 2019-11-26

## 2019-11-26 DIAGNOSIS — M81.0 AGE-RELATED OSTEOPOROSIS W/OUT CURRENT PATHOLOGICAL FRACTURE: ICD-10-CM

## 2019-11-26 RX ORDER — ATORVASTATIN CALCIUM 20 MG/1
20 TABLET, FILM COATED ORAL
Qty: 90 | Refills: 0 | Status: ACTIVE | COMMUNITY
Start: 1900-01-01 | End: 1900-01-01

## 2020-01-27 ENCOUNTER — APPOINTMENT (OUTPATIENT)
Dept: CARDIOLOGY | Facility: CLINIC | Age: 70
End: 2020-01-27
Payer: MEDICARE

## 2020-01-27 ENCOUNTER — NON-APPOINTMENT (OUTPATIENT)
Age: 70
End: 2020-01-27

## 2020-01-27 VITALS
DIASTOLIC BLOOD PRESSURE: 70 MMHG | HEIGHT: 65 IN | SYSTOLIC BLOOD PRESSURE: 106 MMHG | WEIGHT: 154 LBS | OXYGEN SATURATION: 96 % | HEART RATE: 61 BPM | BODY MASS INDEX: 25.66 KG/M2

## 2020-01-27 DIAGNOSIS — Z00.00 ENCOUNTER FOR GENERAL ADULT MEDICAL EXAMINATION W/OUT ABNORMAL FINDINGS: ICD-10-CM

## 2020-01-27 PROCEDURE — 93000 ELECTROCARDIOGRAM COMPLETE: CPT

## 2020-01-27 PROCEDURE — 99214 OFFICE O/P EST MOD 30 MIN: CPT

## 2020-01-27 RX ORDER — ALPRAZOLAM 0.25 MG/1
0.25 TABLET ORAL
Qty: 60 | Refills: 0 | Status: DISCONTINUED | COMMUNITY
Start: 2018-08-31 | End: 2020-01-27

## 2020-01-27 NOTE — HISTORY OF PRESENT ILLNESS
[FreeTextEntry1] : 69 year old female with  hx of CAD LAD PCI , Hypertension , hyperlipidemia , who came for follow up says she is doing well , \par   \par Patient denies any exertional chest pain or shortness of breath on routine exertion ,  Patient think  too much \par \par patient blood pressure is controlled,  patient had blood work in oct 2019 LDL 66  HDL 49 \par \par \par \par \par \par \par \par \par \par

## 2020-01-27 NOTE — ASSESSMENT
[FreeTextEntry1] : Patient with Above hx without active symptoms   \par \par CAD RCA   PCI    Patent stent   continue asa plavix , statin \par \par atypical chest pain ,improved on protonix ,  resolved \par  \par HTN controlled , continue medication \par \par HLD continue medication\par \par sinking feeling probably due to  PACS on holter ,  resolved ,  encourage  activity  recommend telemetry to correlated symptoms with underlying arrhythmia  no significant arrhythmia , improved symptoms \par \par patient is cardiac wise stable , sinus bradycardia due to BB

## 2020-01-28 ENCOUNTER — APPOINTMENT (OUTPATIENT)
Dept: DERMATOLOGY | Facility: CLINIC | Age: 70
End: 2020-01-28
Payer: MEDICARE

## 2020-01-28 PROCEDURE — 99213 OFFICE O/P EST LOW 20 MIN: CPT

## 2020-03-09 ENCOUNTER — APPOINTMENT (OUTPATIENT)
Dept: CARDIOLOGY | Facility: CLINIC | Age: 70
End: 2020-03-09
Payer: MEDICARE

## 2020-03-09 PROCEDURE — 93306 TTE W/DOPPLER COMPLETE: CPT

## 2020-03-10 ENCOUNTER — APPOINTMENT (OUTPATIENT)
Dept: DERMATOLOGY | Facility: CLINIC | Age: 70
End: 2020-03-10
Payer: MEDICARE

## 2020-03-10 ENCOUNTER — APPOINTMENT (OUTPATIENT)
Dept: CARDIOLOGY | Facility: CLINIC | Age: 70
End: 2020-03-10
Payer: MEDICARE

## 2020-03-10 PROCEDURE — 78452 HT MUSCLE IMAGE SPECT MULT: CPT

## 2020-03-10 PROCEDURE — 99213 OFFICE O/P EST LOW 20 MIN: CPT

## 2020-03-10 PROCEDURE — 93015 CV STRESS TEST SUPVJ I&R: CPT

## 2020-03-10 PROCEDURE — A9500: CPT

## 2020-03-10 NOTE — HISTORY OF PRESENT ILLNESS
[de-identified] : The patient has been fit in for urgent appointment. \par c/o rash on arms, comes up after scratching, uncomfortable; \par takes Claritin

## 2020-03-10 NOTE — ASSESSMENT
[FreeTextEntry1] : Dermatographism; \par Education; pt. and daughter;\par Zyrtec/Xyzal discussed;  take for at least 1 month; \par nature explained; \par

## 2020-03-13 ENCOUNTER — APPOINTMENT (OUTPATIENT)
Dept: CARDIOLOGY | Facility: CLINIC | Age: 70
End: 2020-03-13
Payer: MEDICARE

## 2020-03-13 VITALS
OXYGEN SATURATION: 99 % | BODY MASS INDEX: 26.33 KG/M2 | HEART RATE: 62 BPM | HEIGHT: 65 IN | SYSTOLIC BLOOD PRESSURE: 104 MMHG | WEIGHT: 158 LBS | DIASTOLIC BLOOD PRESSURE: 67 MMHG

## 2020-03-13 PROCEDURE — 99214 OFFICE O/P EST MOD 30 MIN: CPT

## 2020-03-13 RX ORDER — PNV NO.95/FERROUS FUM/FOLIC AC 28MG-0.8MG
TABLET ORAL DAILY
Refills: 0 | Status: ACTIVE | COMMUNITY

## 2020-03-13 NOTE — REVIEW OF SYSTEMS
[Negative] : Heme/Lymph [Blurry Vision] : no blurred vision [Earache] : no earache [Shortness Of Breath] : no shortness of breath [Dyspnea on exertion] : not dyspnea during exertion [Chest Pain] : no chest pain [Palpitations] : no palpitations

## 2020-03-13 NOTE — ASSESSMENT
[FreeTextEntry1] : Patient with Above hx without active symptoms   \par \par CAD RCA   PCI    Patent stent   continue asa plavix , statin \par \par atypical chest pain ,improved on protonix ,  resolved  normal chemical myocardial  perfusion \par  \par HTN controlled , continue medication \par \par HLD continue medication\par \par sinking feeling probably due to  PACS on holter ,  resolved ,  encourage  activity  recommend telemetry to correlated symptoms with underlying arrhythmia  no significant arrhythmia , improved symptoms \par \par patient is cardiac wise stable , sinus bradycardia due to BB

## 2020-03-13 NOTE — HISTORY OF PRESENT ILLNESS
[FreeTextEntry1] : 69 year old female with  hx of CAD LAD PCI , Hypertension , hyperlipidemia , who came for follow up after recommended tests , says she is doing well ,  patient had echo showed normal function , normal chemical myocardial perfusion scan \par   \par Patient denies any exertional chest pain or shortness of breath on routine exertion ,  Patient think  too much \par \par patient blood pressure is controlled,  patient had blood work in oct 2019 LDL 66  HDL 49 \par \par \par \par \par \par \par \par \par \par

## 2020-03-16 ENCOUNTER — APPOINTMENT (OUTPATIENT)
Dept: CARDIOLOGY | Facility: CLINIC | Age: 70
End: 2020-03-16

## 2020-03-17 ENCOUNTER — APPOINTMENT (OUTPATIENT)
Dept: RADIOLOGY | Facility: CLINIC | Age: 70
End: 2020-03-17

## 2020-06-08 ENCOUNTER — APPOINTMENT (OUTPATIENT)
Dept: RADIOLOGY | Facility: CLINIC | Age: 70
End: 2020-06-08

## 2020-06-08 ENCOUNTER — APPOINTMENT (OUTPATIENT)
Dept: MAMMOGRAPHY | Facility: CLINIC | Age: 70
End: 2020-06-08

## 2020-06-08 PROCEDURE — 77080 DXA BONE DENSITY AXIAL: CPT | Mod: 26

## 2020-06-08 PROCEDURE — 77067 SCR MAMMO BI INCL CAD: CPT | Mod: 26

## 2020-06-08 PROCEDURE — 77063 BREAST TOMOSYNTHESIS BI: CPT | Mod: 26

## 2020-06-29 ENCOUNTER — APPOINTMENT (OUTPATIENT)
Dept: CARDIOLOGY | Facility: CLINIC | Age: 70
End: 2020-06-29

## 2020-09-08 ENCOUNTER — APPOINTMENT (OUTPATIENT)
Dept: CARDIOLOGY | Facility: CLINIC | Age: 70
End: 2020-09-08

## 2020-09-10 ENCOUNTER — APPOINTMENT (OUTPATIENT)
Dept: CARDIOLOGY | Facility: CLINIC | Age: 70
End: 2020-09-10
Payer: MEDICARE

## 2020-09-10 VITALS — WEIGHT: 160 LBS | BODY MASS INDEX: 26.66 KG/M2 | HEIGHT: 65 IN

## 2020-09-10 PROCEDURE — 99214 OFFICE O/P EST MOD 30 MIN: CPT | Mod: 95

## 2020-09-10 NOTE — ASSESSMENT
[FreeTextEntry1] : Patient with Above hx without active symptoms    with episodes anxiety \par \par CAD RCA   PCI    Patent stent   continue asa plavix , statin \par \par atypical chest pain ,improved on protonix , possible component of anxiety  resolved  normal chemical myocardial  perfusion \par  \par HTN controlled , continue medication \par \par HLD continue medication\par \par sinking feeling probably due to  PACS on holter ,  resolved ,  encourage  activity  recommend telemetry to correlated symptoms with underlying arrhythmia  no significant arrhythmia , improved symptoms \par \par patient is cardiac wise stable , sinus bradycardia due to BB

## 2020-09-10 NOTE — REASON FOR VISIT
[Hyperlipidemia] : hyperlipidemia [Coronary Artery Disease] : coronary artery disease [Follow-Up - Clinic] : a clinic follow-up of [Hypertension] : hypertension [Palpitations] : palpitations [Medication Management] : Medication management [FreeTextEntry1] : CAD

## 2020-09-10 NOTE — DISCUSSION/SUMMARY
[Stable] : stable [Unlikely Cardiac Ischemia (Low Prob.)] : chest pain unlikely to represent cardiac ischemia (low probability) [Patient] : the patient [None] : none

## 2021-01-11 ENCOUNTER — NON-APPOINTMENT (OUTPATIENT)
Age: 71
End: 2021-01-11

## 2021-01-11 ENCOUNTER — APPOINTMENT (OUTPATIENT)
Dept: CARDIOLOGY | Facility: CLINIC | Age: 71
End: 2021-01-11
Payer: MEDICARE

## 2021-01-11 VITALS
DIASTOLIC BLOOD PRESSURE: 78 MMHG | HEART RATE: 65 BPM | SYSTOLIC BLOOD PRESSURE: 126 MMHG | WEIGHT: 155 LBS | TEMPERATURE: 98.4 F | BODY MASS INDEX: 25.83 KG/M2 | HEIGHT: 65 IN | OXYGEN SATURATION: 99 %

## 2021-01-11 PROCEDURE — 93000 ELECTROCARDIOGRAM COMPLETE: CPT

## 2021-01-11 PROCEDURE — 99215 OFFICE O/P EST HI 40 MIN: CPT

## 2021-01-11 RX ORDER — TRIAMCINOLONE ACETONIDE 1 MG/G
0.1 OINTMENT TOPICAL
Qty: 1 | Refills: 1 | Status: DISCONTINUED | COMMUNITY
Start: 2020-01-28 | End: 2021-01-11

## 2021-01-11 RX ORDER — DICLOFENAC SODIUM 10 MG/G
1 GEL TOPICAL
Qty: 100 | Refills: 0 | Status: DISCONTINUED | COMMUNITY
Start: 2020-07-20 | End: 2021-01-11

## 2021-01-11 NOTE — HISTORY OF PRESENT ILLNESS
[FreeTextEntry1] : 70 year old female with  hx of CAD LAD PCI , Hypertension , hyperlipidemia , who came for follow up  says she is doing well , patient does have occasional  anxiety feeling \par \par \par   patient had echo showed normal function , normal chemical myocardial perfusion scan \par   \par Patient denies any exertional chest pain or shortness of breath on routine exertion ,  Patient think  too much \par \par patient blood pressure is controlled,  patient had blood work in oct 2019 LDL 66  HDL 49 \par \par \par \par \par \par \par \par \par \par

## 2021-01-14 ENCOUNTER — APPOINTMENT (OUTPATIENT)
Dept: CARDIOLOGY | Facility: CLINIC | Age: 71
End: 2021-01-14
Payer: MEDICARE

## 2021-01-14 ENCOUNTER — APPOINTMENT (OUTPATIENT)
Dept: DERMATOLOGY | Facility: CLINIC | Age: 71
End: 2021-01-14
Payer: MEDICARE

## 2021-01-14 DIAGNOSIS — L85.3 XEROSIS CUTIS: ICD-10-CM

## 2021-01-14 DIAGNOSIS — L50.8 OTHER URTICARIA: ICD-10-CM

## 2021-01-14 DIAGNOSIS — I87.2 VENOUS INSUFFICIENCY (CHRONIC) (PERIPHERAL): ICD-10-CM

## 2021-01-14 DIAGNOSIS — B35.1 TINEA UNGUIUM: ICD-10-CM

## 2021-01-14 PROCEDURE — 99214 OFFICE O/P EST MOD 30 MIN: CPT

## 2021-01-14 PROCEDURE — 93306 TTE W/DOPPLER COMPLETE: CPT

## 2021-01-14 RX ORDER — KETOCONAZOLE 20 MG/G
2 CREAM TOPICAL
Qty: 1 | Refills: 8 | Status: ACTIVE | COMMUNITY
Start: 2021-01-14 | End: 1900-01-01

## 2021-01-15 PROBLEM — I87.2 CHRONIC STASIS DERMATITIS: Status: ACTIVE | Noted: 2021-01-15

## 2021-01-15 PROBLEM — L85.3 XEROSIS CUTIS: Status: ACTIVE | Noted: 2021-01-15

## 2021-01-15 PROBLEM — L50.8 CHRONIC URTICARIA: Status: ACTIVE | Noted: 2021-01-15

## 2021-02-04 ENCOUNTER — APPOINTMENT (OUTPATIENT)
Dept: CARDIOLOGY | Facility: CLINIC | Age: 71
End: 2021-02-04
Payer: MEDICARE

## 2021-02-04 PROCEDURE — 93015 CV STRESS TEST SUPVJ I&R: CPT

## 2021-02-04 PROCEDURE — A9500: CPT

## 2021-02-04 PROCEDURE — 78452 HT MUSCLE IMAGE SPECT MULT: CPT

## 2021-02-09 ENCOUNTER — APPOINTMENT (OUTPATIENT)
Dept: DERMATOLOGY | Facility: CLINIC | Age: 71
End: 2021-02-09
Payer: MEDICARE

## 2021-02-09 DIAGNOSIS — L50.8 OTHER URTICARIA: ICD-10-CM

## 2021-02-09 DIAGNOSIS — L23.4 ALLERGIC CONTACT DERMATITIS DUE TO DYES: ICD-10-CM

## 2021-02-09 DIAGNOSIS — L30.9 DERMATITIS, UNSPECIFIED: ICD-10-CM

## 2021-02-09 PROCEDURE — 99214 OFFICE O/P EST MOD 30 MIN: CPT

## 2021-03-01 ENCOUNTER — TRANSCRIPTION ENCOUNTER (OUTPATIENT)
Age: 71
End: 2021-03-01

## 2021-03-02 ENCOUNTER — OUTPATIENT (OUTPATIENT)
Dept: OUTPATIENT SERVICES | Facility: HOSPITAL | Age: 71
LOS: 1 days | End: 2021-03-02
Payer: MEDICARE

## 2021-03-02 DIAGNOSIS — Z20.828 CONTACT WITH AND (SUSPECTED) EXPOSURE TO OTHER VIRAL COMMUNICABLE DISEASES: ICD-10-CM

## 2021-03-02 LAB — SARS-COV-2 RNA SPEC QL NAA+PROBE: SIGNIFICANT CHANGE UP

## 2021-03-02 PROCEDURE — U0005: CPT

## 2021-03-02 PROCEDURE — C9803: CPT

## 2021-03-02 PROCEDURE — U0003: CPT

## 2021-03-03 DIAGNOSIS — Z20.828 CONTACT WITH AND (SUSPECTED) EXPOSURE TO OTHER VIRAL COMMUNICABLE DISEASES: ICD-10-CM

## 2021-04-01 ENCOUNTER — APPOINTMENT (OUTPATIENT)
Dept: DERMATOLOGY | Facility: CLINIC | Age: 71
End: 2021-04-01

## 2021-04-01 ENCOUNTER — OUTPATIENT (OUTPATIENT)
Dept: OUTPATIENT SERVICES | Facility: HOSPITAL | Age: 71
LOS: 1 days | End: 2021-04-01
Payer: MEDICARE

## 2021-04-01 DIAGNOSIS — Z20.828 CONTACT WITH AND (SUSPECTED) EXPOSURE TO OTHER VIRAL COMMUNICABLE DISEASES: ICD-10-CM

## 2021-04-01 LAB — SARS-COV-2 RNA SPEC QL NAA+PROBE: SIGNIFICANT CHANGE UP

## 2021-04-01 PROCEDURE — U0005: CPT

## 2021-04-01 PROCEDURE — U0003: CPT

## 2021-04-01 PROCEDURE — C9803: CPT

## 2021-04-02 ENCOUNTER — OUTPATIENT (OUTPATIENT)
Dept: OUTPATIENT SERVICES | Facility: HOSPITAL | Age: 71
LOS: 1 days | End: 2021-04-02
Payer: MEDICARE

## 2021-04-02 ENCOUNTER — APPOINTMENT (OUTPATIENT)
Dept: RADIOLOGY | Facility: CLINIC | Age: 71
End: 2021-04-02
Payer: MEDICARE

## 2021-04-02 DIAGNOSIS — Z00.8 ENCOUNTER FOR OTHER GENERAL EXAMINATION: ICD-10-CM

## 2021-04-02 DIAGNOSIS — Z20.828 CONTACT WITH AND (SUSPECTED) EXPOSURE TO OTHER VIRAL COMMUNICABLE DISEASES: ICD-10-CM

## 2021-04-02 PROCEDURE — 73030 X-RAY EXAM OF SHOULDER: CPT

## 2021-04-02 PROCEDURE — 73030 X-RAY EXAM OF SHOULDER: CPT | Mod: 26,RT

## 2021-05-27 NOTE — ASU PREOP CHECKLIST - DENTURES
Pt c/o jean pain while moving. This nurse called Dr. Noelle Black. Dr. Noelle Black gave orders to remove jean. Will continue with plan of care. no

## 2021-06-29 ENCOUNTER — OUTPATIENT (OUTPATIENT)
Dept: OUTPATIENT SERVICES | Facility: HOSPITAL | Age: 71
LOS: 1 days | End: 2021-06-29
Payer: MEDICARE

## 2021-06-29 ENCOUNTER — APPOINTMENT (OUTPATIENT)
Dept: MAMMOGRAPHY | Facility: CLINIC | Age: 71
End: 2021-06-29
Payer: MEDICARE

## 2021-06-29 PROCEDURE — 77067 SCR MAMMO BI INCL CAD: CPT | Mod: 26

## 2021-06-29 PROCEDURE — 77063 BREAST TOMOSYNTHESIS BI: CPT

## 2021-06-29 PROCEDURE — 77063 BREAST TOMOSYNTHESIS BI: CPT | Mod: 26

## 2021-06-29 PROCEDURE — 77067 SCR MAMMO BI INCL CAD: CPT

## 2021-07-01 ENCOUNTER — TRANSCRIPTION ENCOUNTER (OUTPATIENT)
Age: 71
End: 2021-07-01

## 2021-08-13 ENCOUNTER — NON-APPOINTMENT (OUTPATIENT)
Age: 71
End: 2021-08-13

## 2021-08-13 ENCOUNTER — APPOINTMENT (OUTPATIENT)
Dept: CARDIOLOGY | Facility: CLINIC | Age: 71
End: 2021-08-13
Payer: MEDICARE

## 2021-08-13 VITALS
SYSTOLIC BLOOD PRESSURE: 118 MMHG | HEART RATE: 68 BPM | OXYGEN SATURATION: 98 % | BODY MASS INDEX: 27.32 KG/M2 | HEIGHT: 65 IN | WEIGHT: 164 LBS | DIASTOLIC BLOOD PRESSURE: 75 MMHG

## 2021-08-13 PROCEDURE — 93000 ELECTROCARDIOGRAM COMPLETE: CPT

## 2021-08-13 PROCEDURE — 99214 OFFICE O/P EST MOD 30 MIN: CPT

## 2021-08-13 RX ORDER — NAPROXEN 500 MG/1
500 TABLET ORAL
Qty: 20 | Refills: 0 | Status: DISCONTINUED | COMMUNITY
Start: 2020-10-09 | End: 2021-08-13

## 2021-08-13 RX ORDER — LORATADINE 10 MG/1
10 TABLET ORAL
Qty: 30 | Refills: 0 | Status: DISCONTINUED | COMMUNITY
Start: 2018-04-09 | End: 2021-08-13

## 2021-08-13 NOTE — CARDIOLOGY SUMMARY
[de-identified] : 8/13/21  normal sinus rhythm  [de-identified] : 2/4/21  normal  chemical nuclear stress test [de-identified] : 1/14/21  normal ventricular systolic function , mild DD  [de-identified] : 10/29/18  70% RCA , patent stent in LAD \par \par 10/29/18  RCA stent

## 2021-08-13 NOTE — ASSESSMENT
[FreeTextEntry1] : Patient with Above hx without active symptoms    with episodes anxiety  which is resolved \par \par CAD RCA   PCI    Patent stent   continue asa plavix , statin \par \par Patient  with above hx  now developed new right bundle branch block , normal LVEF normal nuclear stress test \par  \par HTN controlled , continue medication \par \par HLD continue medication\par \par sinking feeling probably due to  PACS on holter ,  resolved ,  encourage  activity  recommend telemetry to correlated symptoms with underlying arrhythmia  no significant arrhythmia , improved symptoms \par \par Patient is overall optimal and stable for low risk procedure ,  ok to hold plavix one week prior , however continue asa without holding  it , \par .\par \par follow up after 4 months \par \par

## 2021-08-13 NOTE — REVIEW OF SYSTEMS
[Negative] : Heme/Lymph [Fever] : no fever [Chills] : no chills [Blurry Vision] : no blurred vision [Earache] : no earache [SOB] : no shortness of breath [Dyspnea on exertion] : not dyspnea during exertion

## 2021-10-01 ENCOUNTER — TRANSCRIPTION ENCOUNTER (OUTPATIENT)
Age: 71
End: 2021-10-01

## 2021-11-19 NOTE — ED ADULT NURSE NOTE - ED STAT RN HANDOFF DETAILS
Anesthesia Pre Eval Note    Anesthesia ROS/Med Hx    Overall Review:  EKG was reviewed     Anesthetic Complication History:  Comment: \"does not need a lot of medications\"    Very sensitive to narcotics  History of emergence delirium     Pulmonary Review:    Negative for sleep apnea History of: asthma - well controlled  The patient is a former smoker.  (still vapes)    Neuro/Psych Review:  Patient does not have a neuro/psych history   Negative for seizures   Negative for CVA    Cardiovascular Review:  Exercise tolerance: good (>4 METS)  Negative for past MI  Negative for CABG/stent  Negative for hypertension    GI/HEPATIC/RENAL Review:    Positive for GERD ([employer]) - well controlled    End/Other Review:  Negative for diabetes  Positive for obesity class II - 35.00 - 39.99  Positive for hypothyroidism  Positive for arthritis  Additional Results:  EKG:  Encounter Date: 09/18/21  -Electrocardiogram 12-Lead:        Result                      Value                           Systolic Blood Pressure     170                             Diastolic Blood Pressu*     91                              Ventricular Rate EKG/M*     74                              Atrial Rate (BPM)           74                              MI-Interval (MSEC)          144                             QRS-Interval (MSEC)         86                              QT-Interval (MSEC)          396                             QTc                         439                             P Axis (Degrees)            -9                              R Axis (Degrees)            0                               T Axis (Degrees)            -9                              REPORT TEXT                                             Normal sinus rhythm   Inferior infarct   , age undetermined   Abnormal ECG   No previous ECGs available   no stemi   Confirmed by CODEY VARMA DO ([id number]),  Nimco Betancourt ([id number]) on 9/18/2021 5:43:18 AM       ALLERGIES:   -- Penicillins --  HIVES and SWELLING    --  Tongue swelling and turned black   -- Codeine -- VOMITING       Lab Results       Component                Value               Date                       WBC                      11.0                09/18/2021                 WBC                      10.1                02/25/2016                 RBC                      5.14                09/18/2021                 RBC                      5.22 (H)            02/25/2016                 HGB                      15.4                09/18/2021                 HGB                      15.4                02/25/2016                 HCT                      44.5                09/18/2021                 HCT                      44.4                02/25/2016                 MCHC                     34.6                09/18/2021                 MCHC                     34.7                02/25/2016                 SODIUM                   139                 09/18/2021                 SODIUM                   138                 02/25/2016                 POTASSIUM                3.7                 09/18/2021                 POTASSIUM                4.0                 02/25/2016                 CHLORIDE                 106                 09/18/2021                 CHLORIDE                 103                 02/25/2016                 CO2                      24                  09/18/2021                 CO2                      25                  02/25/2016                 GLUCOSE                  101 (H)             09/18/2021                 GLUCOSE                  87                  02/25/2016                 BUN                      15                  09/18/2021                 BUN                      12                  02/25/2016                 CREATININE               0.79                09/18/2021                 CREATININE               0.66                02/25/2016                 GFRESTIMATE              88                   09/18/2021                 GFRA                     >90                 02/25/2016                 GFRNA                    >90                 02/25/2016                 CALCIUM                  8.7                 09/18/2021                 CALCIUM                  9.3                 02/25/2016                 PLT                      242                 09/18/2021                 PLT                      225                 02/25/2016             Patient Vitals in the past 24 hrs:      Relevant Problems   No relevant active problems       Physical Exam     Airway   Mallampati: II  TM Distance: >3 FB  Neck ROM: Full  Neck: Able to place in sniff position  TMJ Mobility: Good    Cardiovascular  Cardiovascular exam normal  Cardio Rhythm: Regular  Cardio Rate: Normal    Head Assessment  Head assessment: Normocephalic and Atraumatic    General Assessment  General Assessment: Alert and oriented and No acute distress    Pulmonary Exam  Pulmonary exam normal  Breath sounds clear to auscultation:  Yes    Abdominal Exam    Patient Demonstrates:  Obese      Anesthesia Plan:    ASA Status: 2  Anesthesia Type: General    Induction: Intravenous  Checklist  Reviewed: Lab Results, EKG, NPO Status, Allergies, Medications, Problem list and Past Med History  Consent/Risks Discussed Statement:  The proposed anesthetic plan, including its risks and benefits, have been discussed with the Patient along with the risks and benefits of alternatives. Questions were encouraged and answered and the patient and/or representative understands and agrees to proceed.        I discussed with the patient (and/or patient's legal representative) the risks and benefits of the proposed anesthesia plan, General, which may include services performed by other anesthesia providers.    Alternative anesthesia plans, if available, were reviewed with the patient (and/or patient's legal representative). Discussion has been held with the patient (and/or  patient's legal representative) regarding risks of anesthesia, which include sore throat, vomiting and nausea and emergent situations that may require change in anesthesia plan.    The patient (and/or patient's legal representative) has indicated understanding, his/her questions have been answered, and he/she wishes to proceed with the planned anesthetic.    Blood Products: Not Anticipated    Comments  Plan Comments: Avoid narcotics     Masha UNDERWOOD

## 2021-12-30 ENCOUNTER — APPOINTMENT (OUTPATIENT)
Dept: CARDIOLOGY | Facility: CLINIC | Age: 71
End: 2021-12-30
Payer: MEDICARE

## 2021-12-30 ENCOUNTER — NON-APPOINTMENT (OUTPATIENT)
Age: 71
End: 2021-12-30

## 2021-12-30 VITALS
HEIGHT: 65 IN | WEIGHT: 158 LBS | SYSTOLIC BLOOD PRESSURE: 130 MMHG | HEART RATE: 85 BPM | BODY MASS INDEX: 26.33 KG/M2 | OXYGEN SATURATION: 97 % | DIASTOLIC BLOOD PRESSURE: 70 MMHG

## 2021-12-30 LAB
25(OH)D3 SERPL-MCNC: 37.5 NG/ML
ALBUMIN SERPL ELPH-MCNC: 4.8 G/DL
ALP BLD-CCNC: 73 U/L
ALT SERPL-CCNC: 11 U/L
ANION GAP SERPL CALC-SCNC: 10 MMOL/L
AST SERPL-CCNC: 16 U/L
BASOPHILS # BLD AUTO: 0.02 K/UL
BASOPHILS NFR BLD AUTO: 0.4 %
BILIRUB SERPL-MCNC: 0.5 MG/DL
BUN SERPL-MCNC: 14 MG/DL
CALCIUM SERPL-MCNC: 10 MG/DL
CHLORIDE SERPL-SCNC: 108 MMOL/L
CHOLEST SERPL-MCNC: 147 MG/DL
CK SERPL-CCNC: 81 U/L
CO2 SERPL-SCNC: 24 MMOL/L
CREAT SERPL-MCNC: 0.68 MG/DL
EOSINOPHIL # BLD AUTO: 0.07 K/UL
EOSINOPHIL NFR BLD AUTO: 1.3 %
GLUCOSE SERPL-MCNC: 106 MG/DL
HCT VFR BLD CALC: 39 %
HDLC SERPL-MCNC: 55 MG/DL
HGB BLD-MCNC: 12.5 G/DL
IMM GRANULOCYTES NFR BLD AUTO: 0.2 %
LDLC SERPL CALC-MCNC: 74 MG/DL
LYMPHOCYTES # BLD AUTO: 2.04 K/UL
LYMPHOCYTES NFR BLD AUTO: 37.1 %
MAGNESIUM SERPL-MCNC: 2.2 MG/DL
MAN DIFF?: NORMAL
MCHC RBC-ENTMCNC: 29.3 PG
MCHC RBC-ENTMCNC: 32.1 GM/DL
MCV RBC AUTO: 91.3 FL
MONOCYTES # BLD AUTO: 0.64 K/UL
MONOCYTES NFR BLD AUTO: 11.6 %
NEUTROPHILS # BLD AUTO: 2.72 K/UL
NEUTROPHILS NFR BLD AUTO: 49.4 %
NONHDLC SERPL-MCNC: 92 MG/DL
PLATELET # BLD AUTO: 209 K/UL
POTASSIUM SERPL-SCNC: 5.5 MMOL/L
PROT SERPL-MCNC: 7.6 G/DL
RBC # BLD: 4.27 M/UL
RBC # FLD: 12.6 %
SODIUM SERPL-SCNC: 142 MMOL/L
TRIGL SERPL-MCNC: 91 MG/DL
WBC # FLD AUTO: 5.5 K/UL

## 2021-12-30 PROCEDURE — 93000 ELECTROCARDIOGRAM COMPLETE: CPT

## 2021-12-30 PROCEDURE — 99214 OFFICE O/P EST MOD 30 MIN: CPT

## 2021-12-30 NOTE — REVIEW OF SYSTEMS
[Negative] : Heme/Lymph [Fever] : no fever [Chills] : no chills [Blurry Vision] : no blurred vision [Earache] : no earache [SOB] : no shortness of breath [Dyspnea on exertion] : not dyspnea during exertion [Anxiety] : anxiety

## 2021-12-30 NOTE — HISTORY OF PRESENT ILLNESS
[FreeTextEntry1] : Patient   hx of CAD LAD PCI , Hypertension , hyperlipidemia , anxiety who came for follow up  says she is doing well ,occasionally she does feel pain in discomfort  random when she is anxious  very mild  in severity while mask on  , patient denies any exertional chest pain . Patient \par \par  Patient had echo showed normal LVEF , normal nuclear stress test . \par \par patient does have occasional  anxiety feeling ,\par   \par Patient denies any exertional chest pain or shortness of breath on routine exertion ,\par \par patient blood pressure is controlled,  patient had blood work in December 2021  LDL 74  HDL 55   \par \par \par \par \par \par \par \par \par

## 2021-12-30 NOTE — CARDIOLOGY SUMMARY
[de-identified] : 12/30/21  normal sinus rhythm  [de-identified] : 2/4/21  normal  chemical nuclear stress test [de-identified] : 1/14/21  normal ventricular systolic function , mild DD  [de-identified] : 10/29/18  70% RCA , patent stent in LAD \par \par 10/29/18  RCA stent

## 2021-12-30 NOTE — ASSESSMENT
[FreeTextEntry1] : Patient with Above hx \par \par atypical chest pain possibly  anxiety related , had recent ibis stress test  normal EF  alma give xanax ,  SL nitro \par \par CAD RCA   PCI    Patent stent   continue asa plavix , statin \par \par Patient  with above hx  of intermittent  RBBB , normal LVEF normal nuclear stress test \par  \par HTN controlled , continue medication \par \par HLD continue medication\par \par sinking feeling probably due to  PACS on holter ,  resolved ,  encourage  activity  recommend telemetry to correlated symptoms with underlying arrhythmia  no significant arrhythmia , improved symptoms \par \par follow up after 4 months \par \par

## 2022-03-19 NOTE — HISTORY OF PRESENT ILLNESS
[Home] : at home, [unfilled] , at the time of the visit. [Medical Office: ([facility])___] : at the medical office located in  [Verbal consent obtained from patient] : the patient, [unfilled] [FreeTextEntry4] : quincy melgoza ma [FreeTextEntry1] : 70 year old female with  hx of CAD LAD PCI , Hypertension , hyperlipidemia , who came for follow up  says she is doing well , patient does have occasional  anxiety feeling \par \par \par   patient had echo showed normal function , normal chemical myocardial perfusion scan \par   \par Patient denies any exertional chest pain or shortness of breath on routine exertion ,  Patient think  too much \par \par patient blood pressure is controlled,  patient had blood work in oct 2019 LDL 66  HDL 49 \par \par \par \par \par \par \par \par \par \par  no chills/no nausea/no pain/no tingling

## 2022-07-18 ENCOUNTER — RX RENEWAL (OUTPATIENT)
Age: 72
End: 2022-07-18

## 2022-07-22 ENCOUNTER — RX RENEWAL (OUTPATIENT)
Age: 72
End: 2022-07-22

## 2022-07-22 RX ORDER — PANTOPRAZOLE 40 MG/1
40 TABLET, DELAYED RELEASE ORAL DAILY
Qty: 90 | Refills: 3 | Status: ACTIVE | COMMUNITY
Start: 2022-07-22 | End: 1900-01-01

## 2022-08-22 ENCOUNTER — NON-APPOINTMENT (OUTPATIENT)
Age: 72
End: 2022-08-22

## 2022-08-22 ENCOUNTER — APPOINTMENT (OUTPATIENT)
Dept: CARDIOLOGY | Facility: CLINIC | Age: 72
End: 2022-08-22

## 2022-08-22 VITALS
HEART RATE: 69 BPM | OXYGEN SATURATION: 99 % | SYSTOLIC BLOOD PRESSURE: 134 MMHG | HEIGHT: 65 IN | WEIGHT: 171 LBS | DIASTOLIC BLOOD PRESSURE: 80 MMHG | BODY MASS INDEX: 28.49 KG/M2

## 2022-08-22 PROCEDURE — 99214 OFFICE O/P EST MOD 30 MIN: CPT

## 2022-08-22 PROCEDURE — 93000 ELECTROCARDIOGRAM COMPLETE: CPT

## 2022-08-22 NOTE — REVIEW OF SYSTEMS
[Anxiety] : anxiety [Negative] : Heme/Lymph [Fever] : no fever [Chills] : no chills [Blurry Vision] : no blurred vision [Earache] : no earache [SOB] : no shortness of breath [Dyspnea on exertion] : not dyspnea during exertion

## 2022-08-22 NOTE — CARDIOLOGY SUMMARY
[de-identified] : 8/22/22   normal sinus rhythm  [de-identified] : 2/4/21  normal  chemical nuclear stress test [de-identified] : 1/14/21  normal ventricular systolic function , mild DD  [de-identified] : 10/29/18  70% RCA , patent stent in LAD \par \par 10/29/18  RCA stent

## 2022-08-22 NOTE — ASSESSMENT
[FreeTextEntry1] : Patient with Above hx \par \par atypical chest pain possibly  anxiety related , had recent ibis stress test  normal EF  will give xanax ,  SL nitro \par \par CAD RCA   PCI    Patent stent   continue asa plavix , statin  will obtain her blood work \par \par Patient  with above hx  of intermittent  RBBB , normal LVEF normal nuclear stress test \par  \par HTN controlled , continue medication bisoprolol 5 mg po daily \par \par HLD continue medication\par \par sinking feeling probably due to  PACS on holter ,  resolved ,  encourage  activity  recommend telemetry to correlated symptoms with underlying arrhythmia  no significant arrhythmia , improved symptoms \par \par follow up after 4 months \par \par

## 2022-08-22 NOTE — HISTORY OF PRESENT ILLNESS
[FreeTextEntry1] : Patient   hx of CAD LAD PCI , Hypertension , hyperlipidemia , anxiety who came for follow up  says she is doing well ,except anxiety attacks  ,  patient denies any exertional chest pain . Patient \par \par  Patient had echo showed normal LVEF , normal nuclear stress test . \par \par Patient denies any exertional chest pain or shortness of breath on routine exertion ,\par \par patient blood pressure is controlled,  patient had blood work in December 2021  LDL 74  HDL 55   \par \par schedule to have physical with primary \par \par \par \par \par \par \par \par \par

## 2022-08-24 ENCOUNTER — APPOINTMENT (OUTPATIENT)
Dept: MAMMOGRAPHY | Facility: CLINIC | Age: 72
End: 2022-08-24

## 2022-08-24 ENCOUNTER — OUTPATIENT (OUTPATIENT)
Dept: OUTPATIENT SERVICES | Facility: HOSPITAL | Age: 72
LOS: 1 days | End: 2022-08-24
Payer: MEDICARE

## 2022-08-24 DIAGNOSIS — Z12.31 ENCOUNTER FOR SCREENING MAMMOGRAM FOR MALIGNANT NEOPLASM OF BREAST: ICD-10-CM

## 2022-08-24 PROCEDURE — 77067 SCR MAMMO BI INCL CAD: CPT

## 2022-08-24 PROCEDURE — 77063 BREAST TOMOSYNTHESIS BI: CPT

## 2022-08-24 PROCEDURE — 77063 BREAST TOMOSYNTHESIS BI: CPT | Mod: 26

## 2022-08-24 PROCEDURE — 77067 SCR MAMMO BI INCL CAD: CPT | Mod: 26

## 2022-08-25 ENCOUNTER — OUTPATIENT (OUTPATIENT)
Dept: OUTPATIENT SERVICES | Facility: HOSPITAL | Age: 72
LOS: 1 days | End: 2022-08-25
Payer: MEDICARE

## 2022-08-25 ENCOUNTER — APPOINTMENT (OUTPATIENT)
Dept: RADIOLOGY | Facility: CLINIC | Age: 72
End: 2022-08-25

## 2022-08-25 DIAGNOSIS — Z00.8 ENCOUNTER FOR OTHER GENERAL EXAMINATION: ICD-10-CM

## 2022-08-25 PROCEDURE — 77080 DXA BONE DENSITY AXIAL: CPT | Mod: 26

## 2022-08-25 PROCEDURE — 77080 DXA BONE DENSITY AXIAL: CPT

## 2022-10-07 ENCOUNTER — OFFICE (OUTPATIENT)
Dept: URBAN - METROPOLITAN AREA CLINIC 70 | Facility: CLINIC | Age: 72
Setting detail: OPHTHALMOLOGY
End: 2022-10-07
Payer: MEDICARE

## 2022-10-07 ENCOUNTER — RX ONLY (RX ONLY)
Age: 72
End: 2022-10-07

## 2022-10-07 DIAGNOSIS — Z96.1: ICD-10-CM

## 2022-10-07 DIAGNOSIS — H35.54: ICD-10-CM

## 2022-10-07 DIAGNOSIS — H40.013: ICD-10-CM

## 2022-10-07 DIAGNOSIS — H16.223: ICD-10-CM

## 2022-10-07 PROCEDURE — 92250 FUNDUS PHOTOGRAPHY W/I&R: CPT | Performed by: OPHTHALMOLOGY

## 2022-10-07 PROCEDURE — 92004 COMPRE OPH EXAM NEW PT 1/>: CPT | Performed by: OPHTHALMOLOGY

## 2022-10-07 ASSESSMENT — KERATOMETRY
OD_K2POWER_DIOPTERS: 45.50
OD_AXISANGLE_DEGREES: 090
OS_AXISANGLE_DEGREES: 168
OD_K1POWER_DIOPTERS: 45.50
OS_K1POWER_DIOPTERS: 45.50
OS_K2POWER_DIOPTERS: 46.00

## 2022-10-07 ASSESSMENT — SUPERFICIAL PUNCTATE KERATITIS (SPK)
OS_SPK: 1+ 2+
OD_SPK: 1+ 2+

## 2022-10-07 ASSESSMENT — CONFRONTATIONAL VISUAL FIELD TEST (CVF)
OS_FINDINGS: FULL
OD_FINDINGS: FULL

## 2022-10-07 ASSESSMENT — VISUAL ACUITY
OS_BCVA: 20/25-2
OD_BCVA: 20/30+2

## 2022-10-17 ENCOUNTER — APPOINTMENT (OUTPATIENT)
Dept: RADIOLOGY | Facility: CLINIC | Age: 72
End: 2022-10-17

## 2022-10-17 ENCOUNTER — OUTPATIENT (OUTPATIENT)
Dept: OUTPATIENT SERVICES | Facility: HOSPITAL | Age: 72
LOS: 1 days | End: 2022-10-17
Payer: MEDICARE

## 2022-10-17 PROCEDURE — 71046 X-RAY EXAM CHEST 2 VIEWS: CPT

## 2022-10-17 PROCEDURE — 71046 X-RAY EXAM CHEST 2 VIEWS: CPT | Mod: 26

## 2023-01-05 ENCOUNTER — APPOINTMENT (OUTPATIENT)
Dept: CARDIOLOGY | Facility: CLINIC | Age: 73
End: 2023-01-05
Payer: MEDICARE

## 2023-01-05 ENCOUNTER — NON-APPOINTMENT (OUTPATIENT)
Age: 73
End: 2023-01-05

## 2023-01-05 VITALS
BODY MASS INDEX: 28.16 KG/M2 | DIASTOLIC BLOOD PRESSURE: 68 MMHG | SYSTOLIC BLOOD PRESSURE: 128 MMHG | HEIGHT: 65 IN | WEIGHT: 169 LBS | HEART RATE: 76 BPM | OXYGEN SATURATION: 96 %

## 2023-01-05 DIAGNOSIS — M10.9 GOUT, UNSPECIFIED: ICD-10-CM

## 2023-01-05 PROCEDURE — 99214 OFFICE O/P EST MOD 30 MIN: CPT

## 2023-01-05 PROCEDURE — 93000 ELECTROCARDIOGRAM COMPLETE: CPT

## 2023-01-05 RX ORDER — ALLOPURINOL 100 MG/1
100 TABLET ORAL DAILY
Qty: 90 | Refills: 0 | Status: ACTIVE | COMMUNITY

## 2023-01-05 RX ORDER — ASPIRIN 81 MG
81 TABLET, DELAYED RELEASE (ENTERIC COATED) ORAL
Refills: 0 | Status: ACTIVE | COMMUNITY

## 2023-01-05 ASSESSMENT — REFRACTION_AUTOREFRACTION
OD_AXIS: 077
OS_AXIS: 083
OD_SPHERE: +0.75
OS_CYLINDER: -1.25
OD_CYLINDER: -0.75
OS_SPHERE: +1.00

## 2023-01-05 ASSESSMENT — KERATOMETRY
OD_K1POWER_DIOPTERS: 45.50
OD_K2POWER_DIOPTERS: 45.50
OS_AXISANGLE_DEGREES: 168
OS_K1POWER_DIOPTERS: 45.50
OS_K2POWER_DIOPTERS: 46.00
OD_AXISANGLE_DEGREES: 090

## 2023-01-05 ASSESSMENT — REFRACTION_CURRENTRX
OD_SPHERE: +2.50
OS_OVR_VA: 20/
OD_OVR_VA: 20/
OS_SPHERE: +2.50

## 2023-01-05 ASSESSMENT — AXIALLENGTH_DERIVED
OS_AL: 22.6579
OD_AL: 22.7429

## 2023-01-05 ASSESSMENT — SPHEQUIV_DERIVED
OD_SPHEQUIV: 0.375
OS_SPHEQUIV: 0.375

## 2023-01-05 NOTE — ASSESSMENT
[FreeTextEntry1] : Patient with Above hx \par \par atypical chest pain possibly  anxiety related , had recent ibis stress test  normal EF  will give xanax ,  SL nitro \par \par CAD RCA   PCI    Patent stent   continue asa plavix , statin  will obtain her blood work \par \par Patient  with above hx  of intermittent  RBBB , normal LVEF normal nuclear stress test \par  \par HTN controlled , continue medication bisoprolol 5 mg po daily \par \par HLD continue medication\par \par sinking feeling probably due to  PACS on holter ,  resolved ,  encourage  activity  recommend telemetry to correlated symptoms with underlying arrhythmia  no significant arrhythmia , improved symptoms \par \par will call [facility] to obtain her recent blood work \par \par follow up after 4 months \par \par

## 2023-01-05 NOTE — CARDIOLOGY SUMMARY
[de-identified] : 1/5/23    normal sinus rhythm RBBB  [de-identified] : 2/4/21  normal  chemical nuclear stress test [de-identified] : 1/14/21  normal ventricular systolic function , mild DD  [de-identified] : 10/29/18  70% RCA , patent stent in LAD \par \par 10/29/18  RCA stent

## 2023-01-05 NOTE — HISTORY OF PRESENT ILLNESS
[FreeTextEntry1] : Patient   hx of CAD LAD PCI , Hypertension , hyperlipidemia , anxiety who came for follow up  says she is doing well ,\par \par  patient denies any exertional chest pain .\par \par  Patient had echo showed normal LVEF , normal nuclear stress test . \par \par Patient denies any exertional chest pain or shortness of breath on routine exertion ,\par \par patient blood pressure is controlled,  patient had blood work in December 2021  LDL 74  HDL 55   \par \par schedule to have physical with primary \par \par \par \par \par \par \par \par \par

## 2023-01-30 ENCOUNTER — NON-APPOINTMENT (OUTPATIENT)
Age: 73
End: 2023-01-30

## 2023-04-06 NOTE — PACU DISCHARGE NOTE - NSPTMEETSDISCHCRITERIADT_GEN_A_CORE
improved, appetite was good. On morning rounds 4/6/2023, Rankin Burkitt endorses feeling ready for discharge. Patient denies suicidal or homicidal ideations, denies hallucinations or delusions. Denies SE's from meds. It was decided that maximum benefit from hospital care had been achieved and patient can be discharged. Consults:   Internal Medicine for Medical H&P    Significant Diagnostic Studies: Routine labs and diagnostics    Treatments: Psychotropic medications, therapy with group, milieu, and 1:1 with nurses, social workers, PAKENYATTA/CNP, and Attending physician. Patient was started on both Lamictal and Abilify while on the inpatient unit and has been tolerating well. She has been utilizing both Atarax and Trazodone as needed appropriately.  She will be discharged to [facility].     Discharge Medications:  Current Discharge Medication List        START taking these medications    Details   hydrOXYzine HCl (ATARAX) 50 MG tablet Take 1 tablet by mouth 3 times daily as needed for Anxiety  Qty: 30 tablet, Refills: 0      lamoTRIgine (LAMICTAL) 25 MG tablet Take 1 tablet by mouth daily  Qty: 30 tablet, Refills: 0      ARIPiprazole (ABILIFY) 15 MG tablet Take 1 tablet by mouth at bedtime  Qty: 30 tablet, Refills: 0           CONTINUE these medications which have CHANGED    Details   apixaban (ELIQUIS) 5 MG TABS tablet Take 1 tablet by mouth 2 times daily  Qty: 60 tablet, Refills: 0           STOP taking these medications       levothyroxine (SYNTHROID) 50 MCG tablet Comments:   Reason for Stopping:         traZODone (DESYREL) 100 MG tablet Comments:   Reason for Stopping:         QUEtiapine (SEROQUEL) 100 MG tablet Comments:   Reason for Stopping:         sertraline (ZOLOFT) 100 MG tablet Comments:   Reason for Stopping:                Core Measures statement:   Not applicable    Discharge Exam:  Level of consciousness:  Within normal limits  Appearance: Street clothes, seated, with good
29-Oct-2018 17:25

## 2023-07-10 ENCOUNTER — RX RENEWAL (OUTPATIENT)
Age: 73
End: 2023-07-10

## 2023-07-11 ENCOUNTER — RX RENEWAL (OUTPATIENT)
Age: 73
End: 2023-07-11

## 2023-08-10 ENCOUNTER — APPOINTMENT (OUTPATIENT)
Dept: CARDIOLOGY | Facility: CLINIC | Age: 73
End: 2023-08-10
Payer: MEDICARE

## 2023-08-10 VITALS
BODY MASS INDEX: 28.32 KG/M2 | WEIGHT: 170 LBS | HEART RATE: 69 BPM | HEIGHT: 65 IN | DIASTOLIC BLOOD PRESSURE: 68 MMHG | SYSTOLIC BLOOD PRESSURE: 132 MMHG | OXYGEN SATURATION: 97 %

## 2023-08-10 PROCEDURE — 99214 OFFICE O/P EST MOD 30 MIN: CPT

## 2023-08-10 PROCEDURE — 93000 ELECTROCARDIOGRAM COMPLETE: CPT

## 2023-08-10 NOTE — HISTORY OF PRESENT ILLNESS
[FreeTextEntry1] : Patient hx of CAD LAD PCI , Hypertension , hyperlipidemia , anxiety presents today for cardiac evaluation.  She reports right sided neck pain (pinpoint pain on palpation, difficulty moving her head) and a sinking feeling in her chest for the past 3-4 days.  Both occur at rest and last for a few minutes before resolving.  Neck pain goes away on its own.  Sinking sensation is relieved with walking short distance and taking deep breath.  States sinking sensation similar to what she had prior to stents however not as bad now.  She is concerned because she is traveling to [facility] in September.  She denies shortness of breath, palpitations or lightheadedness.  She is not very active.  Patient had echo 2021 showed normal LVEF.  Normal nuclear stress test 2021.   Patient blood pressure is controlled. Patient had blood work in December 2021  LDL 74  HDL 55.  Due for blood work 8/23 when she goes for physical

## 2023-08-10 NOTE — CARDIOLOGY SUMMARY
[de-identified] : 8/10/23: RSR  1/5/23    normal sinus rhythm RBBB  [de-identified] : 2/4/21  normal  chemical nuclear stress test [de-identified] : 1/14/21  normal ventricular systolic function , mild DD  [de-identified] : 10/29/18  70% RCA , patent stent in LAD \par  \par  10/29/18  RCA stent

## 2023-08-10 NOTE — END OF VISIT
[Time Spent: ___ minutes] : I have spent [unfilled] minutes of time on the encounter. [FreeTextEntry3] : Patient was seen with NP agree with assessment and plan  musculoskeletal neck pain , increase with application ,

## 2023-08-10 NOTE — REVIEW OF SYSTEMS
[Anxiety] : anxiety [Negative] : Heme/Lymph [Fever] : no fever [Headache] : no headache [Chills] : no chills [Feeling Fatigued] : not feeling fatigued [Blurry Vision] : no blurred vision [Earache] : no earache [SOB] : no shortness of breath [Dyspnea on exertion] : not dyspnea during exertion [FreeTextEntry5] : see HPI [FreeTextEntry9] : Right sided neck pain

## 2023-08-10 NOTE — ASSESSMENT
[FreeTextEntry1] : Patient with Above hx   atypical chest pain,  possibly anxiety related.  Has not taken xanax the past few days while symptoms present.  Last ischemic eval 2021.  Recommend pharmacological nuclear stress test and echocardiogram.  Pt may try taking xanax as needed for anxiety.  Labs with PCP. I have asked pts daughter to obtain copy and bring with her to follow up   CAD RCA   PCI    Patent stent .  Cardiac testing as above.   continue asa plavix , statin  will [facility]   HTN controlled , continue medication bisoprolol 5 mg po daily   HLD continue medication  Sinking feeling: possibly due to PAC's on monitor, symptoms had resolved now returned.  Cardiac testing as above. Pt has follow up 8/28/23 with Dr.palla

## 2023-08-15 ENCOUNTER — APPOINTMENT (OUTPATIENT)
Dept: CARDIOLOGY | Facility: CLINIC | Age: 73
End: 2023-08-15
Payer: MEDICARE

## 2023-08-15 PROCEDURE — 93306 TTE W/DOPPLER COMPLETE: CPT

## 2023-08-17 ENCOUNTER — APPOINTMENT (OUTPATIENT)
Dept: CARDIOLOGY | Facility: CLINIC | Age: 73
End: 2023-08-17
Payer: MEDICARE

## 2023-08-17 PROCEDURE — A9500: CPT

## 2023-08-17 PROCEDURE — 93015 CV STRESS TEST SUPVJ I&R: CPT

## 2023-08-17 PROCEDURE — 78452 HT MUSCLE IMAGE SPECT MULT: CPT

## 2023-08-25 ENCOUNTER — OFFICE (OUTPATIENT)
Dept: URBAN - METROPOLITAN AREA CLINIC 12 | Facility: CLINIC | Age: 73
Setting detail: OPHTHALMOLOGY
End: 2023-08-25
Payer: MEDICARE

## 2023-08-25 DIAGNOSIS — H16.223: ICD-10-CM

## 2023-08-25 DIAGNOSIS — Z96.1: ICD-10-CM

## 2023-08-25 DIAGNOSIS — H40.013: ICD-10-CM

## 2023-08-25 PROCEDURE — 92014 COMPRE OPH EXAM EST PT 1/>: CPT | Performed by: STUDENT IN AN ORGANIZED HEALTH CARE EDUCATION/TRAINING PROGRAM

## 2023-08-25 PROCEDURE — 92250 FUNDUS PHOTOGRAPHY W/I&R: CPT | Performed by: STUDENT IN AN ORGANIZED HEALTH CARE EDUCATION/TRAINING PROGRAM

## 2023-08-25 ASSESSMENT — TONOMETRY
OS_IOP_MMHG: 15
OD_IOP_MMHG: 15

## 2023-08-25 ASSESSMENT — CONFRONTATIONAL VISUAL FIELD TEST (CVF)
OS_FINDINGS: FULL
OD_FINDINGS: FULL

## 2023-08-25 ASSESSMENT — SUPERFICIAL PUNCTATE KERATITIS (SPK)
OS_SPK: 1+ 2+
OD_SPK: 1+ 2+

## 2023-08-26 ENCOUNTER — APPOINTMENT (OUTPATIENT)
Dept: MAMMOGRAPHY | Facility: CLINIC | Age: 73
End: 2023-08-26
Payer: MEDICARE

## 2023-08-26 ENCOUNTER — OUTPATIENT (OUTPATIENT)
Dept: OUTPATIENT SERVICES | Facility: HOSPITAL | Age: 73
LOS: 1 days | End: 2023-08-26
Payer: MEDICARE

## 2023-08-26 DIAGNOSIS — Z00.8 ENCOUNTER FOR OTHER GENERAL EXAMINATION: ICD-10-CM

## 2023-08-26 PROCEDURE — 77063 BREAST TOMOSYNTHESIS BI: CPT | Mod: 26

## 2023-08-26 PROCEDURE — 77067 SCR MAMMO BI INCL CAD: CPT | Mod: 26

## 2023-08-26 PROCEDURE — 77067 SCR MAMMO BI INCL CAD: CPT

## 2023-08-26 PROCEDURE — 77063 BREAST TOMOSYNTHESIS BI: CPT

## 2023-08-26 NOTE — ED ADULT TRIAGE NOTE - SOURCE OF INFORMATION
Behavioral Health IP Nursing Progress Note    Suicidal Ideation:  Pt currently denies     Current C-SSRS score: Negative Screen - White (08/26/23 0800)      Protective Factors / Reason for Living: Social supports, Future orientation    Interventions:   · q 15 minute safety checks  · 1:1 assessment  · Group therapy  · Encouraged independence with and completion of ADLs      Subjective: Patient's current mood was described as \"Okay. I guess.\" Pt denies current SI/HI/AVH, pain, and feelings of anxiety or depression. Pt endorses feeling safe on the unit and that she can notify staff if this changes.      Objective:   Mental Health: During 1:1 assessment, patient behavior observed to be depressed with a constricted and flat affect. Throughout the day, pt observed to be anxious and euhporic. Pt was up ad bryce on unit, attended groups, was cooperative with staff, and was social with peers. No evidence of unsafe behaviors were observed.     Medical:   • None this shift     Assessment / Actions:   PRN Medications given?   No   Encouraged pt to attend groups, to utilize coping skills, and to engage appropriately with peers and staff    Plan:   Treatment Plan initiated. Maintain current plan of care.            Patient

## 2023-08-28 ENCOUNTER — APPOINTMENT (OUTPATIENT)
Dept: CARDIOLOGY | Facility: CLINIC | Age: 73
End: 2023-08-28
Payer: MEDICARE

## 2023-08-28 VITALS
WEIGHT: 169 LBS | OXYGEN SATURATION: 97 % | SYSTOLIC BLOOD PRESSURE: 114 MMHG | HEIGHT: 65 IN | BODY MASS INDEX: 28.16 KG/M2 | HEART RATE: 66 BPM | DIASTOLIC BLOOD PRESSURE: 68 MMHG

## 2023-08-28 PROCEDURE — 99214 OFFICE O/P EST MOD 30 MIN: CPT

## 2023-08-28 RX ORDER — NITROGLYCERIN 0.4 MG/1
0.4 TABLET SUBLINGUAL
Qty: 30 | Refills: 2 | Status: ACTIVE | COMMUNITY
Start: 2018-10-30 | End: 1900-01-01

## 2023-08-28 NOTE — CARDIOLOGY SUMMARY
[de-identified] : 8/10/23: RSR  1/5/23    normal sinus rhythm RBBB  [de-identified] : 8/17/23   normal  chemical nuclear stress test [de-identified] : 8/15/23  normal ventricular systolic function , mild DD  mild mR TR PI  [de-identified] : 10/29/18  70% RCA , patent stent in LAD \par  \par  10/29/18  RCA stent

## 2023-08-28 NOTE — HISTORY OF PRESENT ILLNESS
[FreeTextEntry1] : 72 y/o female PMH: CAD LAD PCI , HTN, HLD, anxiety presents today to discuss recent cardiac Reports she is doing well no cardiovascular complaints , patient blood pressure is controlled as well as lipids  Nuclear stress test non ischemic Echo NL LV FX  Cardiac Cath/PCI: 10/29/18 70% RCA , patent stent in LAD

## 2023-08-28 NOTE — DISCUSSION/SUMMARY
[FreeTextEntry1] : Patient here today to discuss recent cardiac testing, she claims to be feeling well Echo shows NL LV FX, Stress test no ischemia   CAD: No active cardiac complaints, testing as above   HTN: Controlled CW current medications  HLD: CW statin will obtain labs from PCP  OV 4 months

## 2023-09-01 ASSESSMENT — KERATOMETRY
OS_AXISANGLE_DEGREES: 174
OD_AXISANGLE_DEGREES: 133
OS_K1POWER_DIOPTERS: 45.50
OD_K2POWER_DIOPTERS: 45.50
OD_K1POWER_DIOPTERS: 45.25
OS_K2POWER_DIOPTERS: 46.00

## 2023-09-01 ASSESSMENT — REFRACTION_MANIFEST
OD_CYLINDER: -0.75
OS_SPHERE: +1.00
OD_AXIS: 80
OS_CYLINDER: -1.25
OS_AXIS: 80
OD_SPHERE: +0.25
OD_VA1: 20/20
OS_ADD: +2.75
OD_ADD: +2.75
OS_VA1: 20/20-2

## 2023-09-01 ASSESSMENT — SPHEQUIV_DERIVED
OS_SPHEQUIV: 0.375
OS_SPHEQUIV: 0.625
OD_SPHEQUIV: 0.125
OD_SPHEQUIV: -0.125

## 2023-09-01 ASSESSMENT — AXIALLENGTH_DERIVED
OS_AL: 22.6579
OD_AL: 22.9688
OD_AL: 22.8769
OS_AL: 22.5684

## 2023-09-01 ASSESSMENT — REFRACTION_CURRENTRX
OS_SPHERE: +2.50
OD_OVR_VA: 20/
OD_SPHERE: +0.25
OS_OVR_VA: 20/
OD_VPRISM_DIRECTION: SV
OD_SPHERE: +2.50
OD_OVR_VA: 20/
OS_OVR_VA: 20/
OS_VPRISM_DIRECTION: SV

## 2023-09-01 ASSESSMENT — REFRACTION_AUTOREFRACTION
OD_SPHERE: +0.50
OS_SPHERE: +1.25
OD_CYLINDER: -0.75
OS_CYLINDER: -1.25
OS_AXIS: 082
OD_AXIS: 081

## 2023-09-01 ASSESSMENT — VISUAL ACUITY
OS_BCVA: 20/25
OD_BCVA: 20/30-

## 2023-10-02 ENCOUNTER — OFFICE (OUTPATIENT)
Dept: URBAN - METROPOLITAN AREA CLINIC 12 | Facility: CLINIC | Age: 73
Setting detail: OPHTHALMOLOGY
End: 2023-10-02
Payer: MEDICARE

## 2023-10-02 DIAGNOSIS — H16.223: ICD-10-CM

## 2023-10-02 DIAGNOSIS — Z96.1: ICD-10-CM

## 2023-10-02 DIAGNOSIS — H40.013: ICD-10-CM

## 2023-10-02 PROCEDURE — 99213 OFFICE O/P EST LOW 20 MIN: CPT | Performed by: STUDENT IN AN ORGANIZED HEALTH CARE EDUCATION/TRAINING PROGRAM

## 2023-10-02 PROCEDURE — 92133 CPTRZD OPH DX IMG PST SGM ON: CPT | Performed by: STUDENT IN AN ORGANIZED HEALTH CARE EDUCATION/TRAINING PROGRAM

## 2023-10-02 PROCEDURE — 92083 EXTENDED VISUAL FIELD XM: CPT | Performed by: STUDENT IN AN ORGANIZED HEALTH CARE EDUCATION/TRAINING PROGRAM

## 2023-10-02 PROCEDURE — 92020 GONIOSCOPY: CPT | Performed by: STUDENT IN AN ORGANIZED HEALTH CARE EDUCATION/TRAINING PROGRAM

## 2023-10-02 ASSESSMENT — SUPERFICIAL PUNCTATE KERATITIS (SPK)
OS_SPK: 1+ 2+
OD_SPK: 1+ 2+

## 2023-10-02 ASSESSMENT — REFRACTION_MANIFEST
OD_VA1: 20/20
OS_AXIS: 80
OS_CYLINDER: -1.25
OD_CYLINDER: -0.75
OD_SPHERE: +0.25
OD_AXIS: 80
OS_SPHERE: +1.00
OD_ADD: +2.75
OS_ADD: +2.75
OS_VA1: 20/20-2

## 2023-10-02 ASSESSMENT — VISUAL ACUITY
OS_BCVA: 20/20-1
OD_BCVA: 20/25-2

## 2023-10-02 ASSESSMENT — AXIALLENGTH_DERIVED
OD_AL: 22.9201
OS_AL: 22.613
OD_AL: 23.0124
OS_AL: 22.6579

## 2023-10-02 ASSESSMENT — CONFRONTATIONAL VISUAL FIELD TEST (CVF)
OD_FINDINGS: FULL
OS_FINDINGS: FULL

## 2023-10-02 ASSESSMENT — KERATOMETRY
OD_K2POWER_DIOPTERS: 45.50
OS_AXISANGLE_DEGREES: 163
OS_K1POWER_DIOPTERS: 45.25
OD_K1POWER_DIOPTERS: 45.00
OD_AXISANGLE_DEGREES: 151
OS_K2POWER_DIOPTERS: 46.25

## 2023-10-02 ASSESSMENT — TONOMETRY
OS_IOP_MMHG: 15
OD_IOP_MMHG: 14

## 2023-10-02 ASSESSMENT — REFRACTION_CURRENTRX
OS_VPRISM_DIRECTION: SV
OD_VPRISM_DIRECTION: SV
OD_SPHERE: +0.25
OS_SPHERE: +2.50
OD_SPHERE: +2.50
OD_OVR_VA: 20/
OS_OVR_VA: 20/

## 2023-10-02 ASSESSMENT — REFRACTION_AUTOREFRACTION
OD_SPHERE: +0.50
OS_CYLINDER: -1.50
OS_AXIS: 077
OD_AXIS: 075
OD_CYLINDER: -0.75
OS_SPHERE: +1.25

## 2023-10-02 ASSESSMENT — SPHEQUIV_DERIVED
OD_SPHEQUIV: -0.125
OS_SPHEQUIV: 0.5
OD_SPHEQUIV: 0.125
OS_SPHEQUIV: 0.375

## 2023-12-07 ENCOUNTER — APPOINTMENT (OUTPATIENT)
Dept: CARDIOLOGY | Facility: CLINIC | Age: 73
End: 2023-12-07
Payer: MEDICARE

## 2023-12-07 VITALS
HEART RATE: 71 BPM | DIASTOLIC BLOOD PRESSURE: 62 MMHG | HEIGHT: 65 IN | OXYGEN SATURATION: 98 % | SYSTOLIC BLOOD PRESSURE: 120 MMHG | WEIGHT: 172 LBS | BODY MASS INDEX: 28.66 KG/M2

## 2023-12-07 VITALS
WEIGHT: 172 LBS | RESPIRATION RATE: 16 BRPM | BODY MASS INDEX: 28.66 KG/M2 | SYSTOLIC BLOOD PRESSURE: 120 MMHG | TEMPERATURE: 98.4 F | DIASTOLIC BLOOD PRESSURE: 62 MMHG | OXYGEN SATURATION: 98 % | HEIGHT: 65 IN | HEART RATE: 71 BPM

## 2023-12-07 DIAGNOSIS — R00.2 PALPITATIONS: ICD-10-CM

## 2023-12-07 DIAGNOSIS — R06.09 OTHER FORMS OF DYSPNEA: ICD-10-CM

## 2023-12-07 DIAGNOSIS — R07.89 OTHER CHEST PAIN: ICD-10-CM

## 2023-12-07 DIAGNOSIS — I45.10 UNSPECIFIED RIGHT BUNDLE-BRANCH BLOCK: ICD-10-CM

## 2023-12-07 DIAGNOSIS — F41.9 ANXIETY DISORDER, UNSPECIFIED: ICD-10-CM

## 2023-12-07 PROCEDURE — 93000 ELECTROCARDIOGRAM COMPLETE: CPT

## 2023-12-07 PROCEDURE — 99214 OFFICE O/P EST MOD 30 MIN: CPT

## 2023-12-07 RX ORDER — ALPRAZOLAM 0.25 MG/1
0.25 TABLET ORAL
Qty: 30 | Refills: 1 | Status: ACTIVE | COMMUNITY
Start: 2020-09-10 | End: 1900-01-01

## 2024-01-29 ENCOUNTER — APPOINTMENT (OUTPATIENT)
Dept: CARDIOLOGY | Facility: CLINIC | Age: 74
End: 2024-01-29

## 2024-02-29 ENCOUNTER — OFFICE (OUTPATIENT)
Dept: URBAN - METROPOLITAN AREA CLINIC 12 | Facility: CLINIC | Age: 74
Setting detail: OPHTHALMOLOGY
End: 2024-02-29
Payer: MEDICARE

## 2024-02-29 DIAGNOSIS — H16.223: ICD-10-CM

## 2024-02-29 PROCEDURE — 92012 INTRM OPH EXAM EST PATIENT: CPT | Performed by: STUDENT IN AN ORGANIZED HEALTH CARE EDUCATION/TRAINING PROGRAM

## 2024-02-29 ASSESSMENT — REFRACTION_MANIFEST
OD_AXIS: 80
OS_CYLINDER: -1.25
OS_VA1: 20/20-2
OD_SPHERE: +0.25
OS_ADD: +2.75
OD_CYLINDER: -0.75
OD_VA1: 20/20
OS_AXIS: 80
OS_SPHERE: +1.00
OD_ADD: +2.75

## 2024-02-29 ASSESSMENT — CONFRONTATIONAL VISUAL FIELD TEST (CVF)
OS_FINDINGS: FULL
OD_FINDINGS: FULL

## 2024-02-29 ASSESSMENT — REFRACTION_CURRENTRX
OS_VPRISM_DIRECTION: SV
OS_OVR_VA: 20/
OD_SPHERE: +0.25
OD_SPHERE: +2.50
OS_SPHERE: +2.50
OD_OVR_VA: 20/
OD_VPRISM_DIRECTION: SV

## 2024-02-29 ASSESSMENT — SPHEQUIV_DERIVED
OS_SPHEQUIV: 0.375
OD_SPHEQUIV: -0.125

## 2024-05-01 ENCOUNTER — APPOINTMENT (OUTPATIENT)
Dept: CARDIOLOGY | Facility: CLINIC | Age: 74
End: 2024-05-01
Payer: MEDICARE

## 2024-05-01 ENCOUNTER — NON-APPOINTMENT (OUTPATIENT)
Age: 74
End: 2024-05-01

## 2024-05-01 VITALS
OXYGEN SATURATION: 98 % | HEIGHT: 65 IN | BODY MASS INDEX: 28.16 KG/M2 | HEART RATE: 72 BPM | SYSTOLIC BLOOD PRESSURE: 116 MMHG | DIASTOLIC BLOOD PRESSURE: 60 MMHG | WEIGHT: 169 LBS

## 2024-05-01 DIAGNOSIS — K21.9 GASTRO-ESOPHAGEAL REFLUX DISEASE W/OUT ESOPHAGITIS: ICD-10-CM

## 2024-05-01 DIAGNOSIS — I25.10 ATHEROSCLEROTIC HEART DISEASE OF NATIVE CORONARY ARTERY W/OUT ANGINA PECTORIS: ICD-10-CM

## 2024-05-01 DIAGNOSIS — I10 ESSENTIAL (PRIMARY) HYPERTENSION: ICD-10-CM

## 2024-05-01 DIAGNOSIS — E78.5 HYPERLIPIDEMIA, UNSPECIFIED: ICD-10-CM

## 2024-05-01 PROCEDURE — 99214 OFFICE O/P EST MOD 30 MIN: CPT

## 2024-05-01 PROCEDURE — G2211 COMPLEX E/M VISIT ADD ON: CPT

## 2024-05-01 PROCEDURE — 93000 ELECTROCARDIOGRAM COMPLETE: CPT

## 2024-05-01 NOTE — HISTORY OF PRESENT ILLNESS
[FreeTextEntry1] : Patient hx of CAD LAD PCI , Hypertension , hyperlipidemia , anxiety came for follow up says she is feeling fine , [place] from [place]   Patient does have occasionally she has to take deep breath ,at rest , not related exertion , no chest pain ,   her blood pressure  controlled , patient had echo  normal EF , chemical stress test august 2023    Patient blood pressure is controlled. Patient had blood work in December 2021  LDL 74  HDL 55.  Due for blood work 8/23 when she goes for physical

## 2024-05-01 NOTE — CARDIOLOGY SUMMARY
[de-identified] : 5/1/24   normal sinus rhythm  no RBBB ( intermittent RBBB ) [de-identified] : 8/18/23  normal  chemical nuclear stress test [de-identified] : 8/15/23   normal ventricular systolic function , mild DD  Mild MR TR  [de-identified] : 10/29/18  70% RCA , patent stent in LAD \par  \par  10/29/18  RCA stent

## 2024-05-01 NOTE — REVIEW OF SYSTEMS
[SOB] : shortness of breath [Anxiety] : anxiety [Negative] : Heme/Lymph [Fever] : no fever [Headache] : no headache [Chills] : no chills [Feeling Fatigued] : not feeling fatigued [Blurry Vision] : no blurred vision [Earache] : no earache [Dyspnea on exertion] : not dyspnea during exertion [Palpitations] : no palpitations [Abdominal Pain] : no abdominal pain [Constipation] : no constipation [FreeTextEntry5] : see HPI [FreeTextEntry9] : Right sided neck pain

## 2024-05-01 NOTE — REASON FOR VISIT
[Symptom and Test Evaluation] : symptom and test evaluation [Hyperlipidemia] : hyperlipidemia [Hypertension] : hypertension [Coronary Artery Disease] : coronary artery disease [Family Member] : family member

## 2024-05-01 NOTE — DISCUSSION/SUMMARY
[FreeTextEntry1] : episodes of deep breath s  possibly anxiety related , had recent ibis stress test normal EF will give xanax , SL nitro  CAD RCA PCI Patent stent continue asa plavix , statin ,will obtain her blood work  Patient with above hx of intermittent RBBB , normal LVEF normal nuclear stress test  HTN :controlled , continue medication bisoprolol 5 mg po daily  HLD : controlled continue medication  sinking feeling probably due to PACS on holter , resolved , encourage activity recommend telemetry to correlated symptoms with underlying arrhythmia no significant arrhythmia, improved symptoms [EKG obtained to assist in diagnosis and management of assessed problem(s)] : EKG obtained to assist in diagnosis and management of assessed problem(s)

## 2024-05-16 NOTE — HISTORY OF PRESENT ILLNESS
[FreeTextEntry1] : Patient   hx of CAD LAD PCI , Hypertension , hyperlipidemia , anxiety who came for follow up  says she is doing well ,  gained 7  pounds , Patient had echo showed normal LVEF , normal nuclear stress test . patient is anticipating to have colonoscopy \par \par patient does have occasional  anxiety feeling ,\par   \par Patient denies any exertional chest pain or shortness of breath on routine exertion ,\par \par patient blood pressure is controlled,  patient had blood work in oct 2019 LDL 66  HDL 49     patient had blood work  with primary   was told to be normal lipids \par \par \par \par \par \par \par \par \par \par  Normal vision: sees adequately in most situations; can see medication labels, newsprint

## 2024-08-29 ENCOUNTER — OFFICE (OUTPATIENT)
Dept: URBAN - METROPOLITAN AREA CLINIC 12 | Facility: CLINIC | Age: 74
Setting detail: OPHTHALMOLOGY
End: 2024-08-29

## 2024-08-29 DIAGNOSIS — H90.3: ICD-10-CM

## 2024-08-29 PROCEDURE — CANCEL CANCEL

## 2024-08-30 ENCOUNTER — APPOINTMENT (OUTPATIENT)
Dept: CARDIOLOGY | Facility: CLINIC | Age: 74
End: 2024-08-30
Payer: MEDICARE

## 2024-08-30 ENCOUNTER — NON-APPOINTMENT (OUTPATIENT)
Age: 74
End: 2024-08-30

## 2024-08-30 VITALS
TEMPERATURE: 98.4 F | HEART RATE: 80 BPM | WEIGHT: 167 LBS | OXYGEN SATURATION: 97 % | RESPIRATION RATE: 16 BRPM | BODY MASS INDEX: 27.82 KG/M2 | DIASTOLIC BLOOD PRESSURE: 68 MMHG | HEIGHT: 65 IN | SYSTOLIC BLOOD PRESSURE: 130 MMHG

## 2024-08-30 VITALS
OXYGEN SATURATION: 97 % | BODY MASS INDEX: 27.82 KG/M2 | WEIGHT: 167 LBS | HEART RATE: 80 BPM | HEIGHT: 65 IN | SYSTOLIC BLOOD PRESSURE: 136 MMHG | DIASTOLIC BLOOD PRESSURE: 68 MMHG

## 2024-08-30 DIAGNOSIS — K21.9 GASTRO-ESOPHAGEAL REFLUX DISEASE W/OUT ESOPHAGITIS: ICD-10-CM

## 2024-08-30 DIAGNOSIS — E78.5 HYPERLIPIDEMIA, UNSPECIFIED: ICD-10-CM

## 2024-08-30 DIAGNOSIS — I10 ESSENTIAL (PRIMARY) HYPERTENSION: ICD-10-CM

## 2024-08-30 DIAGNOSIS — I25.10 ATHEROSCLEROTIC HEART DISEASE OF NATIVE CORONARY ARTERY W/OUT ANGINA PECTORIS: ICD-10-CM

## 2024-08-30 PROCEDURE — G2211 COMPLEX E/M VISIT ADD ON: CPT

## 2024-08-30 PROCEDURE — 99214 OFFICE O/P EST MOD 30 MIN: CPT

## 2024-08-30 NOTE — DISCUSSION/SUMMARY
[Unlikely Cardiac Ischemia (Low Prob.)] : chest pain unlikely to represent cardiac ischemia (low probability) [Stable] : stable [None] : none [Patient] : the patient No Repair - Repaired With Adjacent Surgical Defect Text (Leave Blank If You Do Not Want): After the excision the defect was repaired concurrently with another surgical defect which was in close approximation.

## 2024-08-30 NOTE — HISTORY OF PRESENT ILLNESS
[FreeTextEntry1] : Patient hx of CAD LAD PCI , Hypertension , hyperlipidemia , anxiety came for follow up says she is feeling fine ,  Patient does have occasionally she has to take deep breath ,at rest , not related exertion , no chest pain ,   her blood pressure controlled , patient had echo  normal EF , chemical stress test august 2023  patient does have occasional acid reflux resolves with PPI ,    Patient blood pressure is controlled. Patient had blood work in august 2023   had blood work done today pending results

## 2024-08-30 NOTE — CARDIOLOGY SUMMARY
[de-identified] : 8/30/24   normal sinus rhythm  no RBBB ( intermittent RBBB ) [de-identified] : 8/18/23  normal  chemical nuclear stress test [de-identified] : 8/15/23   normal ventricular systolic function , mild DD  Mild MR TR  [de-identified] : 10/29/18  70% RCA , patent stent in LAD \par  \par  10/29/18  RCA stent

## 2024-10-01 ENCOUNTER — APPOINTMENT (OUTPATIENT)
Dept: MAMMOGRAPHY | Facility: CLINIC | Age: 74
End: 2024-10-01
Payer: MEDICARE

## 2024-10-01 ENCOUNTER — OUTPATIENT (OUTPATIENT)
Dept: OUTPATIENT SERVICES | Facility: HOSPITAL | Age: 74
LOS: 1 days | End: 2024-10-01
Payer: MEDICARE

## 2024-10-01 ENCOUNTER — APPOINTMENT (OUTPATIENT)
Dept: RADIOLOGY | Facility: CLINIC | Age: 74
End: 2024-10-01
Payer: MEDICARE

## 2024-10-01 DIAGNOSIS — Z00.8 ENCOUNTER FOR OTHER GENERAL EXAMINATION: ICD-10-CM

## 2024-10-01 PROCEDURE — 77067 SCR MAMMO BI INCL CAD: CPT | Mod: 26

## 2024-10-01 PROCEDURE — 77080 DXA BONE DENSITY AXIAL: CPT | Mod: 26

## 2024-10-01 PROCEDURE — 77063 BREAST TOMOSYNTHESIS BI: CPT | Mod: 26

## 2024-11-07 ENCOUNTER — APPOINTMENT (OUTPATIENT)
Age: 74
End: 2024-11-07
Payer: MEDICARE

## 2024-11-08 ENCOUNTER — APPOINTMENT (OUTPATIENT)
Dept: CARDIOLOGY | Facility: CLINIC | Age: 74
End: 2024-11-08
Payer: MEDICARE

## 2024-11-08 ENCOUNTER — NON-APPOINTMENT (OUTPATIENT)
Age: 74
End: 2024-11-08

## 2024-11-08 VITALS
HEART RATE: 80 BPM | OXYGEN SATURATION: 97 % | WEIGHT: 164 LBS | BODY MASS INDEX: 27.32 KG/M2 | RESPIRATION RATE: 16 BRPM | SYSTOLIC BLOOD PRESSURE: 122 MMHG | HEIGHT: 65 IN | DIASTOLIC BLOOD PRESSURE: 70 MMHG | TEMPERATURE: 98.4 F

## 2024-11-08 VITALS
HEIGHT: 65 IN | DIASTOLIC BLOOD PRESSURE: 70 MMHG | BODY MASS INDEX: 27.32 KG/M2 | OXYGEN SATURATION: 97 % | WEIGHT: 164 LBS | HEART RATE: 80 BPM | SYSTOLIC BLOOD PRESSURE: 122 MMHG

## 2024-11-08 DIAGNOSIS — I25.10 ATHEROSCLEROTIC HEART DISEASE OF NATIVE CORONARY ARTERY W/OUT ANGINA PECTORIS: ICD-10-CM

## 2024-11-08 DIAGNOSIS — E78.5 HYPERLIPIDEMIA, UNSPECIFIED: ICD-10-CM

## 2024-11-08 DIAGNOSIS — I10 ESSENTIAL (PRIMARY) HYPERTENSION: ICD-10-CM

## 2024-11-08 DIAGNOSIS — Z01.810 ENCOUNTER FOR PREPROCEDURAL CARDIOVASCULAR EXAMINATION: ICD-10-CM

## 2024-11-08 PROCEDURE — 99203 OFFICE O/P NEW LOW 30 MIN: CPT

## 2024-11-08 PROCEDURE — G2211 COMPLEX E/M VISIT ADD ON: CPT

## 2024-11-08 PROCEDURE — 99214 OFFICE O/P EST MOD 30 MIN: CPT

## 2024-11-08 PROCEDURE — 93000 ELECTROCARDIOGRAM COMPLETE: CPT

## 2024-11-20 PROCEDURE — 77063 BREAST TOMOSYNTHESIS BI: CPT

## 2024-11-20 PROCEDURE — 77080 DXA BONE DENSITY AXIAL: CPT

## 2024-11-20 PROCEDURE — 77067 SCR MAMMO BI INCL CAD: CPT

## 2024-12-09 NOTE — ASU PATIENT PROFILE, ADULT - PAIN SCALE PREFERRED, PROFILE
What Type Of Note Output Would You Prefer (Optional)?: Standard Output How Severe Are Your Spot(S)?: mild Hpi Title: Evaluation of Skin Lesions none

## 2024-12-12 ENCOUNTER — APPOINTMENT (OUTPATIENT)
Age: 74
End: 2024-12-12

## 2024-12-19 ENCOUNTER — APPOINTMENT (OUTPATIENT)
Age: 74
End: 2024-12-19

## 2025-02-27 ENCOUNTER — NON-APPOINTMENT (OUTPATIENT)
Age: 75
End: 2025-02-27

## 2025-02-27 ENCOUNTER — APPOINTMENT (OUTPATIENT)
Dept: CARDIOLOGY | Facility: CLINIC | Age: 75
End: 2025-02-27
Payer: MEDICARE

## 2025-02-27 VITALS
BODY MASS INDEX: 27.32 KG/M2 | HEART RATE: 73 BPM | OXYGEN SATURATION: 97 % | WEIGHT: 164 LBS | SYSTOLIC BLOOD PRESSURE: 120 MMHG | DIASTOLIC BLOOD PRESSURE: 66 MMHG | HEIGHT: 65 IN

## 2025-02-27 DIAGNOSIS — E78.5 HYPERLIPIDEMIA, UNSPECIFIED: ICD-10-CM

## 2025-02-27 DIAGNOSIS — K21.9 GASTRO-ESOPHAGEAL REFLUX DISEASE W/OUT ESOPHAGITIS: ICD-10-CM

## 2025-02-27 DIAGNOSIS — I10 ESSENTIAL (PRIMARY) HYPERTENSION: ICD-10-CM

## 2025-02-27 DIAGNOSIS — I25.10 ATHEROSCLEROTIC HEART DISEASE OF NATIVE CORONARY ARTERY W/OUT ANGINA PECTORIS: ICD-10-CM

## 2025-02-27 PROCEDURE — 99214 OFFICE O/P EST MOD 30 MIN: CPT

## 2025-02-27 PROCEDURE — 93000 ELECTROCARDIOGRAM COMPLETE: CPT

## 2025-02-27 PROCEDURE — G2211 COMPLEX E/M VISIT ADD ON: CPT

## 2025-05-14 ENCOUNTER — NON-APPOINTMENT (OUTPATIENT)
Age: 75
End: 2025-05-14

## 2025-05-16 ENCOUNTER — NON-APPOINTMENT (OUTPATIENT)
Age: 75
End: 2025-05-16

## 2025-05-16 ENCOUNTER — APPOINTMENT (OUTPATIENT)
Dept: CARDIOLOGY | Facility: CLINIC | Age: 75
End: 2025-05-16
Payer: MEDICARE

## 2025-05-16 VITALS
DIASTOLIC BLOOD PRESSURE: 70 MMHG | OXYGEN SATURATION: 98 % | WEIGHT: 169 LBS | BODY MASS INDEX: 28.16 KG/M2 | HEART RATE: 67 BPM | HEIGHT: 65 IN | SYSTOLIC BLOOD PRESSURE: 116 MMHG

## 2025-05-16 DIAGNOSIS — I10 ESSENTIAL (PRIMARY) HYPERTENSION: ICD-10-CM

## 2025-05-16 DIAGNOSIS — E78.5 HYPERLIPIDEMIA, UNSPECIFIED: ICD-10-CM

## 2025-05-16 DIAGNOSIS — I25.10 ATHEROSCLEROTIC HEART DISEASE OF NATIVE CORONARY ARTERY W/OUT ANGINA PECTORIS: ICD-10-CM

## 2025-05-16 DIAGNOSIS — K21.9 GASTRO-ESOPHAGEAL REFLUX DISEASE W/OUT ESOPHAGITIS: ICD-10-CM

## 2025-05-16 PROCEDURE — 99214 OFFICE O/P EST MOD 30 MIN: CPT

## 2025-05-16 PROCEDURE — 93000 ELECTROCARDIOGRAM COMPLETE: CPT

## 2025-05-16 PROCEDURE — G2211 COMPLEX E/M VISIT ADD ON: CPT

## 2025-06-19 ENCOUNTER — NON-APPOINTMENT (OUTPATIENT)
Age: 75
End: 2025-06-19

## 2025-06-19 ENCOUNTER — APPOINTMENT (OUTPATIENT)
Dept: CARDIOLOGY | Facility: CLINIC | Age: 75
End: 2025-06-19
Payer: MEDICARE

## 2025-06-19 VITALS
WEIGHT: 166 LBS | HEIGHT: 65 IN | BODY MASS INDEX: 27.66 KG/M2 | OXYGEN SATURATION: 98 % | DIASTOLIC BLOOD PRESSURE: 72 MMHG | SYSTOLIC BLOOD PRESSURE: 138 MMHG | HEART RATE: 70 BPM

## 2025-06-19 VITALS
OXYGEN SATURATION: 98 % | HEART RATE: 70 BPM | RESPIRATION RATE: 16 BRPM | BODY MASS INDEX: 27.66 KG/M2 | WEIGHT: 166 LBS | HEIGHT: 65 IN

## 2025-06-19 VITALS — DIASTOLIC BLOOD PRESSURE: 70 MMHG | SYSTOLIC BLOOD PRESSURE: 128 MMHG

## 2025-06-19 LAB
ALBUMIN SERPL ELPH-MCNC: 4.3 G/DL
ALP BLD-CCNC: 75 U/L
ALT SERPL-CCNC: 20 U/L
ANION GAP SERPL CALC-SCNC: 14 MMOL/L
AST SERPL-CCNC: 20 U/L
BASOPHILS # BLD AUTO: 0.04 K/UL
BASOPHILS NFR BLD AUTO: 0.8 %
BILIRUB SERPL-MCNC: 0.5 MG/DL
BUN SERPL-MCNC: 18 MG/DL
CALCIUM SERPL-MCNC: 10.1 MG/DL
CHLORIDE SERPL-SCNC: 104 MMOL/L
CHOLEST SERPL-MCNC: 161 MG/DL
CO2 SERPL-SCNC: 22 MMOL/L
CREAT SERPL-MCNC: 0.64 MG/DL
EGFRCR SERPLBLD CKD-EPI 2021: 93 ML/MIN/1.73M2
EOSINOPHIL # BLD AUTO: 0.07 K/UL
EOSINOPHIL NFR BLD AUTO: 1.4 %
GLUCOSE SERPL-MCNC: 140 MG/DL
HCT VFR BLD CALC: 37.3 %
HDLC SERPL-MCNC: 55 MG/DL
HGB BLD-MCNC: 12.1 G/DL
IMM GRANULOCYTES NFR BLD AUTO: 0.2 %
LDLC SERPL-MCNC: 86 MG/DL
LYMPHOCYTES # BLD AUTO: 1.98 K/UL
LYMPHOCYTES NFR BLD AUTO: 38.7 %
MAN DIFF?: NORMAL
MCHC RBC-ENTMCNC: 29.7 PG
MCHC RBC-ENTMCNC: 32.4 G/DL
MCV RBC AUTO: 91.4 FL
MONOCYTES # BLD AUTO: 0.6 K/UL
MONOCYTES NFR BLD AUTO: 11.7 %
NEUTROPHILS # BLD AUTO: 2.41 K/UL
NEUTROPHILS NFR BLD AUTO: 47.2 %
NONHDLC SERPL-MCNC: 107 MG/DL
PLATELET # BLD AUTO: 146 K/UL
POTASSIUM SERPL-SCNC: 4.3 MMOL/L
PROT SERPL-MCNC: 7.4 G/DL
RBC # BLD: 4.08 M/UL
RBC # FLD: 12.8 %
SODIUM SERPL-SCNC: 140 MMOL/L
TRIGL SERPL-MCNC: 115 MG/DL
TROPONIN I SERPL HS-MCNC: <3 NG/L
WBC # FLD AUTO: 5.11 K/UL

## 2025-06-19 PROCEDURE — G2211 COMPLEX E/M VISIT ADD ON: CPT

## 2025-06-19 PROCEDURE — 99215 OFFICE O/P EST HI 40 MIN: CPT

## 2025-06-19 PROCEDURE — 93306 TTE W/DOPPLER COMPLETE: CPT

## 2025-06-19 PROCEDURE — 93000 ELECTROCARDIOGRAM COMPLETE: CPT

## 2025-06-24 ENCOUNTER — APPOINTMENT (OUTPATIENT)
Dept: CARDIOLOGY | Facility: CLINIC | Age: 75
End: 2025-06-24
Payer: MEDICARE

## 2025-06-24 PROCEDURE — 93015 CV STRESS TEST SUPVJ I&R: CPT

## 2025-06-24 PROCEDURE — A9500: CPT

## 2025-06-24 PROCEDURE — 78452 HT MUSCLE IMAGE SPECT MULT: CPT

## 2025-06-30 ENCOUNTER — RX RENEWAL (OUTPATIENT)
Age: 75
End: 2025-06-30